# Patient Record
Sex: FEMALE | Race: BLACK OR AFRICAN AMERICAN | Employment: UNEMPLOYED | URBAN - METROPOLITAN AREA
[De-identification: names, ages, dates, MRNs, and addresses within clinical notes are randomized per-mention and may not be internally consistent; named-entity substitution may affect disease eponyms.]

---

## 2019-10-15 LAB
CREATININE, EXTERNAL: 0.7
HBA1C MFR BLD HPLC: 6.9 %
LDL-C, EXTERNAL: 93
MICROALBUMIN UR TEST STR-MCNC: 13 MG/DL

## 2020-01-10 ENCOUNTER — OFFICE VISIT (OUTPATIENT)
Dept: ENDOCRINOLOGY | Age: 45
End: 2020-01-10

## 2020-01-10 VITALS
WEIGHT: 138.6 LBS | DIASTOLIC BLOOD PRESSURE: 90 MMHG | HEIGHT: 61 IN | HEART RATE: 80 BPM | BODY MASS INDEX: 26.17 KG/M2 | SYSTOLIC BLOOD PRESSURE: 135 MMHG

## 2020-01-10 DIAGNOSIS — E10.65 HYPERGLYCEMIA DUE TO TYPE 1 DIABETES MELLITUS (HCC): Primary | ICD-10-CM

## 2020-01-10 LAB — HBA1C MFR BLD HPLC: 6.3 %

## 2020-01-10 RX ORDER — INSULIN LISPRO 100 [IU]/ML
INJECTION, SOLUTION INTRAVENOUS; SUBCUTANEOUS
COMMUNITY
Start: 2019-12-29 | End: 2020-04-09

## 2020-01-10 RX ORDER — ALPRAZOLAM 0.5 MG/1
0.5 TABLET ORAL
COMMUNITY
Start: 2018-07-24 | End: 2021-09-22

## 2020-01-10 RX ORDER — PAROXETINE 7.5 MG/1
CAPSULE ORAL
COMMUNITY
Start: 2019-12-06 | End: 2022-06-20

## 2020-01-10 RX ORDER — TRAZODONE HYDROCHLORIDE 50 MG/1
50 TABLET ORAL
COMMUNITY
End: 2021-09-22

## 2020-01-10 NOTE — PROGRESS NOTES
Chief Complaint   Patient presents with    New Patient    Diabetes    Diabetic Foot Exam       HPI  This is a 58-year-old -American female who I previously saw at [facility] for many years but last saw 6 years ago after she moved to [place]. She now returns to [place]. She recently got  and she and her  are now back in [place]. She now presents for follow-up. She continues on an Omni pod insulin pump and uses a Dexcom G6 system. Settings on her pump are as follows  Midnight 0.65  Noon 0.75  6 PM 0.6  Bolus 1-12  Correction 60  Duration 4 hours    Download of her Dexcom G6 reveals an in range of 60% and her A1c today is 6.3%. The blood sugars on the download are excellent. She has minimal hypo-or hyperglycemia which would certainly be consistent with the A1c of 6.3%. Part of this is related to the fact that she is exercising a whole lot more since moving back to [place] because she is not working. So she is wanting 5 days a week and walking the dogs. Her diet continues to be very healthy. She is not having any episodes of hypoglycemia. She does tell me that she is now perimenopausal.  ROS  Review of Systems - General ROS: negative  Psychological ROS: positive for - anxiety  Ophthalmic ROS: negative  ENT ROS: negative  Endocrine ROS: positive for - hot flashes  Respiratory ROS: no cough, shortness of breath, or wheezing  Cardiovascular ROS: no chest pain or dyspnea on exertion  Gastrointestinal ROS: no abdominal pain, change in bowel habits, or black or bloody stools  Genito-Urinary ROS: no dysuria, trouble voiding, or hematuria  Musculoskeletal ROS: negative  positive for - joint pain  No family history on file.      Social History     Socioeconomic History    Marital status:      Spouse name: Not on file    Number of children: Not on file    Years of education: Not on file    Highest education level: Not on file   Tobacco Use    Smoking status: Former Smoker    Smokeless tobacco: Never Used   Substance and Sexual Activity    Alcohol use: Yes    Drug use: No        Vitals:    01/10/20 0910   BP: 135/90   Pulse: 80   Weight: 138 lb 9.6 oz (62.9 kg)   Height: 5' 1\" (1.549 m)   PainSc:   0 - No pain        Physical Examination: General appearance - alert, well appearing, and in no distress  Neck - supple, no significant adenopathy, thyroid exam: thyroid is normal in size without nodules or tenderness  Chest - clear to auscultation, no wheezes, rales or rhonchi, symmetric air entry  Heart - normal rate, regular rhythm, normal S1, S2, no murmurs, rubs, clicks or gallops  Abdomen - soft, nontender, nondistended, no masses or organomegaly  Neurological - alert, oriented, normal speech, no focal findings or movement disorder noted  Musculoskeletal - no joint tenderness, deformity or swelling  Skin - normal coloration and turgor, no rashes, no suspicious skin lesions noted    Diabetic foot exam:     Left: Reflexes 2+     Vibratory sensation normal    Filament test normal sensation with micro filament   Pulse DP: 2+ (normal)   Pulse PT: 2+ (normal)   Deformities: None  Right: Reflexes 2+   Vibratory sensation normal   Filament test normal sensation with micro filament   Pulse DP: 2+ (normal)   Pulse PT: 2+ (normal)   Deformities: None      ASSESSMENT & PLAN  This is a 70-year-old -American female with type 1 diabetes mellitus currently on an Omni pod insulin pump and a Dexcom G6 system with an A1c of 6.3%. There is certainly nothing to change in terms of her insulin pump settings and her overall diabetes strategy. There are several issues:  1. Patient has complaints of diffuse muscle aches. She tells me she was in the [facility] and has been evaluated for a number of potential pathogens that might be causing the muscle aches. As far she knows she is never been tested for Lyme disease although she was living in [place] for 6 years.   I will send a referral for rheumatology evaluation. 2.  Anxiety currently well controlled.

## 2020-03-17 ENCOUNTER — TELEPHONE (OUTPATIENT)
Dept: ENDOCRINOLOGY | Age: 45
End: 2020-03-17

## 2020-03-17 NOTE — TELEPHONE ENCOUNTER
Pt called she wanted to know since she's type1 diabetic, she wanted to know, who she needs to contact if she start getting cough, fever and other symptoms, she just want to be proactive. Please advise. She can be reach @ [phone].

## 2020-04-17 ENCOUNTER — VIRTUAL VISIT (OUTPATIENT)
Dept: ENDOCRINOLOGY | Age: 45
End: 2020-04-17

## 2020-04-17 DIAGNOSIS — E10.65 HYPERGLYCEMIA DUE TO TYPE 1 DIABETES MELLITUS (HCC): Primary | ICD-10-CM

## 2020-04-17 RX ORDER — BLOOD SUGAR DIAGNOSTIC
STRIP MISCELLANEOUS
COMMUNITY
Start: 2020-01-31 | End: 2021-02-22 | Stop reason: SDUPTHER

## 2020-04-17 RX ORDER — DICLOFENAC SODIUM 10 MG/G
GEL TOPICAL
COMMUNITY
Start: 2020-03-14 | End: 2022-06-20

## 2020-04-17 NOTE — PROGRESS NOTES
This is an established visit conducted via telemedicine using [employer] video. The patient has been instructed that this meets HIPAA criteria and acknowledges and agrees to this method of visitation. This is a 40-year-old -American female who I previously saw at [facility] for many years but last saw 6 years ago after she moved to [place]. She now returns to [place]. She recently got  and she and her  are now back in [place]. She now presents for follow-up.     She continues on an Omni pod insulin pump and uses a Dexcom G6 system. Settings on her pump are as follows  Midnight 0.65  Noon 0.75  6 PM 0.6  Bolus 1-12  Correction 60  Duration 4 hours    I downloaded her Dexcom system. She now has a calculated A1c of 7.2. She is 56% in range. The issue appears to be higher blood sugars in the evening which she then corrects and can sometimes cause some overnight mild hypoglycemia. None of the episodes are severe. In fact she only has 2% of her blood sugar readings that are \"low\". She feels well. She saw a rheumatologist for evaluation of what she thought might be rheumatoid arthritis. She was told it was skeletal muscle and not joint issues. She was given a cream.  She is not very satisfied with the answer. But she does go for walks with the dog every day and overall is doing well. She does admit that since the coronavirus, she is not out as much and she is not running and that was controlling her blood sugar much more extensively. Impression type 1 diabetes mellitus with overall good blood sugar control and an insulin pump  Plan: I did not change the regimen. I did focus on the evening meals so that she will be correcting at night and get low overnight. We will see her back in 4 months or sooner as needed.     We spent more than 25 mintutes face to face, more than 50% was in counseling regarding diet, exercise and carbohydrate intake.

## 2020-08-26 ENCOUNTER — VIRTUAL VISIT (OUTPATIENT)
Dept: ENDOCRINOLOGY | Age: 45
End: 2020-08-26
Payer: COMMERCIAL

## 2020-08-26 DIAGNOSIS — E10.65 HYPERGLYCEMIA DUE TO TYPE 1 DIABETES MELLITUS (HCC): Primary | ICD-10-CM

## 2020-08-26 PROCEDURE — 99214 OFFICE O/P EST MOD 30 MIN: CPT | Performed by: INTERNAL MEDICINE

## 2020-08-26 RX ORDER — HYDROXYCHLOROQUINE SULFATE 200 MG/1
1 TABLET, FILM COATED ORAL 3 TIMES WEEKLY
COMMUNITY
Start: 2020-07-29 | End: 2022-06-20

## 2020-08-26 NOTE — PROGRESS NOTES
This is an established visit conducted via telemedicine using [employer] video. The patient has been instructed that this meets HIPAA criteria ,that they may receive a bill for these services and acknowledges and agrees to this method of visitation. This is a 62-year-old -American female with type 1 diabetes mellitus who I previously saw at [facility] for many years but last saw 6 years ago after she moved to [place].  She now returns to [place].  She recently got  and she and her  are now back in [place]. Ag Morel now presents for follow-up.     She continues on an Omni pod insulin pump and uses a Dexcom G6 system.  Settings on her pump are as follows  Midnight 0.65  Noon 0.75  6 PM 0.6  Bolus 1-12  Correction 60  Duration 4 hours     I downloaded her Dexcom system. She now has a calculated A1c of 7.0. She is 58% in range. 5% lows. The issue appears to be higher blood sugars in the evening which she then corrects and can sometimes cause some overnight mild hypoglycemia. None of the episodes are severe. In fact she only has 2% of her blood sugar readings that are \"low\". She feels well. She saw a rheumatologist for evaluation of what she thought might be rheumatoid arthritis. She was told it was skeletal muscle and not joint issues. 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 But she does go for walks with the dog every day and overall is doing well. She does admit that since the coronavirus, she is not out as much and she is not running and that was controlling her blood sugar much more extensively.     Examination  GENERAL: NCAT, Appears well nourished   EYES: EOMI, non-icteric, no proptosis   Ear/Nose/Throat: NCAT, no visible inflammation or masses   CARDIOVASCULAR: no cyanosis, no visible JVD   RESPIRATORY: comfortable respirations observed, no cyanosis   MUSCULOSKELETAL: Normal ROM of upper extremities observed   SKIN: No edema, rash, or other significant changes observed   NEUROLOGIC:  AAOx3   PSYCHIATRIC: Normal affect, Normal insight and judgement       Impression type 1 diabetes mellitus with an A1c of 7% based on her Dexcom download. Plan: I did not change the regimen. She and her  in the are in the process of moving to a new house and so there is a lot of physical activity which can occasionally cause some lows but overall she is compensating well.   We will see her back in 3 months.

## 2020-11-23 ENCOUNTER — VIRTUAL VISIT (OUTPATIENT)
Dept: ENDOCRINOLOGY | Age: 45
End: 2020-11-23
Payer: COMMERCIAL

## 2020-11-23 DIAGNOSIS — E10.65 HYPERGLYCEMIA DUE TO TYPE 1 DIABETES MELLITUS (HCC): Primary | ICD-10-CM

## 2020-11-23 PROCEDURE — 99214 OFFICE O/P EST MOD 30 MIN: CPT | Performed by: INTERNAL MEDICINE

## 2020-11-23 NOTE — PROGRESS NOTES
This is an established visit conducted via telemedicine using [employer] video. The patient has been instructed that this meets HIPAA criteria ,that they may receive a bill for these services and acknowledges and agrees to this method of visitation.     This is a 58-year-old -American female with type 1 diabetes mellitus who I previously saw at [address] [place] [address] for many years but last saw 6 years ago after she moved to [place].  She now returns to [facility].  She recently got  and she and her  are now back in [facility]. Jacqui Carias now presents for follow-up.     She continues on an Omni pod insulin pump and uses a Dexcom G6 system.  Settings on her pump are as follows  Midnight 0.65  Noon 0.75  6 PM 0.6  Bolus 1-12  Correction 60  Duration 4 hours     I downloaded her Dexcom system.  She now has a calculated A1c of 7.1.  She is 55% in range. 5% lows. The issue appears to be higher blood sugars in the evening which she then corrects and can sometimes cause some overnight mild hypoglycemia.  None of the episodes are severe.  In fact she only has 2% of her blood sugar readings that are \"low\".  She feels well.  She saw a rheumatologist for evaluation of what she thought might be rheumatoid arthritis. Jacqui Carias was told it was skeletal muscle and not joint issues. 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 But she does go for walks with the dog every day and overall is doing well.  She does admit that since the coronavirus, she is not out as much and she is not running and that was controlling her blood sugar much more extensively. She does tell me that a right ganglion cyst which has been drained twice has recurred and she is concerned about that.   She is also concerned about her right heel where there is a little bit of \"numbness\". Otherwise she continues to do very well. Examination  GENERAL: NCAT, Appears well nourished   EYES: EOMI, non-icteric, no proptosis   Ear/Nose/Throat: NCAT, no visible inflammation or masses   CARDIOVASCULAR: no cyanosis, no visible JVD   RESPIRATORY: comfortable respirations observed, no cyanosis   MUSCULOSKELETAL: Normal ROM of upper extremities observed. There is a very large ganglion cyst on the dorsum of the right hand  SKIN: No edema, rash, or other significant changes observed   NEUROLOGIC:  AAOx3   PSYCHIATRIC: Normal affect, Normal insight and judgement       Impression type 1 diabetes mellitus with reasonable glucose control on an Omni pod/Dexcom system  Plan: She is hoping to get the new new Horizon system as soon as it becomes available. She will need to see a hand surgeon regarding the ganglion cyst and I will try to find one for her and refer her.   No other changes were made.

## 2021-02-22 ENCOUNTER — VIRTUAL VISIT (OUTPATIENT)
Dept: ENDOCRINOLOGY | Age: 46
End: 2021-02-22
Payer: COMMERCIAL

## 2021-02-22 DIAGNOSIS — E10.65 HYPERGLYCEMIA DUE TO TYPE 1 DIABETES MELLITUS (HCC): Primary | ICD-10-CM

## 2021-02-22 DIAGNOSIS — Z96.41 INSULIN PUMP IN PLACE: ICD-10-CM

## 2021-02-22 PROCEDURE — 99214 OFFICE O/P EST MOD 30 MIN: CPT | Performed by: INTERNAL MEDICINE

## 2021-02-22 RX ORDER — BLOOD SUGAR DIAGNOSTIC
STRIP MISCELLANEOUS
Qty: 150 STRIP | Refills: 3 | Status: SHIPPED | OUTPATIENT
Start: 2021-02-22

## 2021-02-22 NOTE — PROGRESS NOTES
This is an established visit conducted via telemedicine using [email or web address] video.  The patient has been instructed that this meets HIPAA criteria ,that they may receive a bill for these services and acknowledges and agrees to this method of visitation.     This is a 48-year-old -American female with type 1 diabetes mellitus who I previously saw at [facility] for many years but last saw 6 years ago after she moved to [place].  She now returns to [address] [facility].  She recently got  and she and her  are now back in [address] [facility]. Marilynn Ayoub now presents for follow-up.     She continues on an Omni pod insulin pump and uses a Dexcom G6 system.  Settings on her pump are as follows  Midnight 0.65  Noon 0.75  6 PM 0.6  Bolus 1-12  Correction 60  Duration 4 hours     I downloaded her Dexcom system.  She now has a calculated A1c of 6.8.  She is 59% in range.  7% lows.  Most of the lows are occurring overnight. This is actually occurring more frequently now. She also tells me that occasionally which she put when she puts an Omni pod pod on, she experiences some bleeding. It appears that the bleeding is when she takes the pod off and she sees some blood on the backside of the pod.   The issue also appears to be higher blood sugars in the evening which she then corrects and can sometimes cause some overnight mild hypoglycemia.  None of the episodes are severe.  She feels well.  She saw a rheumatologist for evaluation of what she thought might be rheumatoid arthritis. Marilynn Ayoub was told it was skeletal muscle and not joint issues.  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 But she does go for walks with the dog every day and overall is doing well.  She does admit that since the coronavirus, she is not out as much and she is not running and that was controlling her blood sugar much more extensively.     She does tell me that a right ganglion cyst which has been drained twice has recurred and she is concerned about that. She is also concerned about her right heel where there is a little bit of \"numbness\". Otherwise she continues to do very well. Examination  GENERAL: NCAT, Appears well nourished   EYES: EOMI, non-icteric, no proptosis   Ear/Nose/Throat: NCAT, no visible inflammation or masses   CARDIOVASCULAR: no cyanosis, no visible JVD   RESPIRATORY: comfortable respirations observed, no cyanosis   MUSCULOSKELETAL: Normal ROM of upper extremities observed   SKIN: No edema, rash, or other significant changes observed   NEUROLOGIC:  AAOx3   PSYCHIATRIC: Normal affect, Normal insight and judgement       Impression  1. Type 1 diabetes mellitus with overall good glucose control on an Omni pod insulin pump and a Dexcom G6 system  2. Rheumatoid arthritis currently on hydroxychloroquine    Plan:  1. I did decrease her basal rate overnight. Her new settings will be as follows: Midnight 0.60 6 AM 0.65  0.75 6 PM 0.6  2. I would like to get screening laboratory test.  I have not done any since she been back to [place] but she is very uncomfortable going out currently and so we will defer this until the next visit.   3.  I will see her back in 3 months hopefully face-to-face.

## 2021-03-15 ENCOUNTER — TELEPHONE (OUTPATIENT)
Dept: ENDOCRINOLOGY | Age: 46
End: 2021-03-15

## 2021-06-23 ENCOUNTER — OFFICE VISIT (OUTPATIENT)
Dept: ENDOCRINOLOGY | Age: 46
End: 2021-06-23
Payer: COMMERCIAL

## 2021-06-23 VITALS
DIASTOLIC BLOOD PRESSURE: 85 MMHG | WEIGHT: 134.6 LBS | SYSTOLIC BLOOD PRESSURE: 128 MMHG | BODY MASS INDEX: 26.42 KG/M2 | HEART RATE: 80 BPM | HEIGHT: 60 IN

## 2021-06-23 DIAGNOSIS — Z96.41 INSULIN PUMP IN PLACE: ICD-10-CM

## 2021-06-23 DIAGNOSIS — E10.65 HYPERGLYCEMIA DUE TO TYPE 1 DIABETES MELLITUS (HCC): Primary | ICD-10-CM

## 2021-06-23 DIAGNOSIS — E10.65 HYPERGLYCEMIA DUE TO TYPE 1 DIABETES MELLITUS (HCC): ICD-10-CM

## 2021-06-23 LAB — HBA1C MFR BLD HPLC: 7.1 %

## 2021-06-23 PROCEDURE — 83036 HEMOGLOBIN GLYCOSYLATED A1C: CPT | Performed by: INTERNAL MEDICINE

## 2021-06-23 PROCEDURE — 99214 OFFICE O/P EST MOD 30 MIN: CPT | Performed by: INTERNAL MEDICINE

## 2021-06-23 PROCEDURE — 3051F HG A1C>EQUAL 7.0%<8.0%: CPT | Performed by: INTERNAL MEDICINE

## 2021-06-23 RX ORDER — ALPRAZOLAM 0.25 MG/1
0.25 TABLET ORAL
Qty: 30 TABLET | Refills: 1 | Status: SHIPPED | OUTPATIENT
Start: 2021-06-23

## 2021-06-23 NOTE — PROGRESS NOTES
This is a 58-year-old -American female with type 1 diabetes mellitus who I previously saw at [facility] for many years but last saw 6 years ago after she moved to [place].  She now returns to [address] [place] [address].  She recently got  and she and her  are now back in [address] [facility]. [place] now presents for follow-up.     She continues on an Omni pod insulin pump and uses a Dexcom G6 system.  Settings on her pump are as follows  Midnight 0.65  Noon 0.75  6 PM 0.6  Bolus 1-12  Correction 60  Duration 4 hours     I downloaded her Dexcom system.  She now has a calculated A1c of 7.1.  She is 59% in range.  7% lows.  Most of the lows are occurring overnight. This is actually occurring more frequently now. She also tells me that occasionally which she put when she puts an Omni pod pod on, she experiences some bleeding. It appears that the bleeding is when she takes the pod off and she sees some blood on the backside of the pod.   The issue also appears to be higher blood sugars in the evening which she then corrects and can sometimes cause some overnight mild hypoglycemia.  None of the episodes are severe.  She feels well.  She saw a rheumatologist for evaluation of what she thought might be rheumatoid arthritis. [place] was told it was skeletal muscle and not joint issues.  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 But she does go for walks with the dog every day and overall is doing well.  She does admit that since the coronavirus, she is not out as much and she is not running and that was controlling her blood sugar much more extensively.     She does tell me that a right ganglion cyst which has been drained twice has recurred and she is concerned about that. [place] is also concerned about her right heel where there is a little bit of \"numbness\".  Otherwise she continues to do very well. Impression  Blood pressure 128/85  Pulse 76  Weight 134  HEENT unremarkable  Lungs clear  Heart reveals a regular rate and rhythm  Abdomen benign  Extremities  There is a right ganglion cyst on the dorsum of the right hand  Feet are dry without ulceration or tissue breakdown  Diabetic foot exam:     Left Foot:   Visual Exam: normal    Pulse DP: 2+ (normal)   Filament test: normal sensation    Vibratory sensation: normal      Right Foot:   Visual Exam: normal    Pulse DP: 2+ (normal)   Filament test: normal sensation    Vibratory sensation: normal    Pression  1. Type 1 diabetes mellitus with an A1c of 7.1%  2.  Stress related fluctuations in blood sugar  3. Right ganglion cyst    Plan:  1. We have continued the above regimen. 2.  We will see her back in 4 months  3. I did order all screening laboratory tests and will call her with the results.

## 2021-09-14 ENCOUNTER — TELEPHONE (OUTPATIENT)
Dept: ENDOCRINOLOGY | Age: 46
End: 2021-09-14

## 2021-09-14 NOTE — TELEPHONE ENCOUNTER
----- Message from April Rabb sent at 9/14/2021  3:06 PM EDT -----  Regarding: /Telephone  General Message/Vendor Calls    Caller's first and last name: n/a      Reason for call: Pt need Information to put into her new pump     Callback required yes/no and why: yes , to give the information to her       Best contact number(s): [phone]      Details to clarify the request: Pt need Information to put into her new pump ( Correction factor , how many units throughout the day      April Rabb

## 2021-09-22 ENCOUNTER — OFFICE VISIT (OUTPATIENT)
Dept: ENDOCRINOLOGY | Age: 46
End: 2021-09-22
Payer: COMMERCIAL

## 2021-09-22 VITALS
HEIGHT: 60 IN | WEIGHT: 133.4 LBS | HEART RATE: 80 BPM | DIASTOLIC BLOOD PRESSURE: 89 MMHG | SYSTOLIC BLOOD PRESSURE: 139 MMHG | BODY MASS INDEX: 26.19 KG/M2

## 2021-09-22 DIAGNOSIS — Z96.41 INSULIN PUMP IN PLACE: ICD-10-CM

## 2021-09-22 DIAGNOSIS — E10.65 HYPERGLYCEMIA DUE TO TYPE 1 DIABETES MELLITUS (HCC): Primary | ICD-10-CM

## 2021-09-22 LAB — HBA1C MFR BLD HPLC: 6.5 %

## 2021-09-22 PROCEDURE — 83036 HEMOGLOBIN GLYCOSYLATED A1C: CPT | Performed by: INTERNAL MEDICINE

## 2021-09-22 PROCEDURE — 99214 OFFICE O/P EST MOD 30 MIN: CPT | Performed by: INTERNAL MEDICINE

## 2021-09-22 NOTE — PROGRESS NOTES
This is a 79-year-old -American female with type 1 diabetes mellitus who I previously saw at [address] [place] [address] for many years but last saw 6 years ago after she moved to [place].  She now returns to [place].  She recently got  and she and her  are now back in [place]. Saurabh Le now presents for follow-up.     She continues on an Omni pod insulin pump and uses a Dexcom G6 system.  Settings on her pump are as follows  Midnight 0.65  Noon 0.75  6 PM 0.6  Bolus 1-12  Correction 60  Duration 4 hours     I downloaded her Dexcom system.  She now has a A1c of 6.5%.  She is 49% in range.  7% lows.  Most of the lows are occurring overnight.  This is actually occurring more frequently now. She admits that because of the stress at work, she is drinking more alcohol (wine) and admits that the episodes of lows overnight are closely correlated with the alcohol intake.   She is also stressed and so she and her  are eating a lot of fast food and high carbohydrate meals for dinner resulting in some hyperglycemia during the day and evening.      She saw a rheumatologist for evaluation of what she thought might be rheumatoid arthritis. Saurabh Le was told it was skeletal muscle and not joint issues.  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 But she does go for walks with the dog every day and overall is doing well.  She does admit that since the coronavirus, she is not out as much and she is not running and that was controlling her blood sugar much more extensively.     She does tell me that a right ganglion cyst which has been drained twice has recurred and she is concerned about that. Saurabh Le is also concerned about her right heel where there is a little bit of \"numbness\".  Otherwise she continues to do very well.    Examination  Blood pressure 139/89  Pulse 80  Weight 133  BMI 26.1  HEENT unremarkable  Lungs clear  Heart reveals a regular rate and rhythm  Abdomen benign  Extremities unremarkable  Diabetic foot exam:     Left Foot:   Visual Exam: normal    Pulse DP: 2+ (normal)   Filament test: normal sensation    Vibratory sensation: normal      Right Foot:   Visual Exam: normal    Pulse DP: 2+ (normal)   Filament test: normal sensation    Vibratory sensation: normal    Impression  1. Type 1 diabetes mellitus with an A1c today of 6.5% but a download from her Dexcom that shows considerable variability in blood sugars  2. Chronic anxiety    Plan:  1. I have continue the settings on her pump. 2.  I did strongly advise her to curtail her alcohol intake in the evenings to improve her sleep and decrease her frequency of hypoglycemia  3. I will see her back in 3 months.

## 2021-11-29 ENCOUNTER — TELEPHONE (OUTPATIENT)
Dept: ENDOCRINOLOGY | Age: 46
End: 2021-11-29

## 2021-11-29 DIAGNOSIS — E10.65 HYPERGLYCEMIA DUE TO TYPE 1 DIABETES MELLITUS (HCC): Primary | ICD-10-CM

## 2021-11-29 RX ORDER — BLOOD-GLUCOSE SENSOR
1 EACH MISCELLANEOUS
Qty: 9 EACH | Refills: 3 | Status: SHIPPED | OUTPATIENT
Start: 2021-11-29 | End: 2022-09-19 | Stop reason: SDUPTHER

## 2021-11-29 RX ORDER — BLOOD-GLUCOSE TRANSMITTER
1 EACH MISCELLANEOUS DAILY
Qty: 1 EACH | Refills: 3 | Status: SHIPPED | OUTPATIENT
Start: 2021-11-29 | End: 2022-09-19 | Stop reason: SDUPTHER

## 2021-11-29 NOTE — TELEPHONE ENCOUNTER
----- Message from Marisol Nicole sent at 11/29/2021  9:57 AM EST -----  Regarding: /telephone  Contact: [phone]  General Message/Vendor Calls    Caller's first and last name: n/a      Reason for call: Is calling to make sure Decom supplies were sent to [facility] required yes/no and why: Yes      Best contact number(s): [phone]      Details to clarify the request: Is urgent, has been out of supplies for about a week.  Was told all dr needed to do was sign order that was faxed before they can send out the delivery       Marisol Nicole
Dr. Manolo Posey has contacted [address] [place] [address]  and sent a prescription to them for the pt's supplies
all other ROS negative except as per HPI

## 2022-02-14 RX ORDER — INSULIN LISPRO 100 [IU]/ML
INJECTION, SOLUTION INTRAVENOUS; SUBCUTANEOUS
Qty: 50 ML | Refills: 3 | Status: SHIPPED | OUTPATIENT
Start: 2022-02-14 | End: 2022-02-18

## 2022-02-14 NOTE — TELEPHONE ENCOUNTER
Requested Prescriptions     Pending Prescriptions Disp Refills    insulin lispro (HumaLOG U-100 Insulin) 100 unit/mL injection 50 mL 3     Si UNITS DAILY VIA INSULIN PUMP

## 2022-02-14 NOTE — TELEPHONE ENCOUNTER
Pt called and LVM 2/14 @ 9:47am. Stated needs a refill of her humaLOG and sent to a new pharmacy as well. Pt said [facility] in [place]. [facility] and pharmacy # is [id number].

## 2022-02-18 RX ORDER — INSULIN LISPRO 100 [IU]/ML
INJECTION, SOLUTION INTRAVENOUS; SUBCUTANEOUS
Qty: 50 ML | Refills: 2 | Status: SHIPPED | OUTPATIENT
Start: 2022-02-18 | End: 2022-09-19 | Stop reason: SDUPTHER

## 2022-03-07 ENCOUNTER — OFFICE VISIT (OUTPATIENT)
Dept: ENDOCRINOLOGY | Age: 47
End: 2022-03-07
Payer: COMMERCIAL

## 2022-03-07 VITALS
DIASTOLIC BLOOD PRESSURE: 89 MMHG | HEIGHT: 60 IN | SYSTOLIC BLOOD PRESSURE: 142 MMHG | WEIGHT: 140 LBS | BODY MASS INDEX: 27.48 KG/M2 | HEART RATE: 79 BPM

## 2022-03-07 DIAGNOSIS — E10.65 HYPERGLYCEMIA DUE TO TYPE 1 DIABETES MELLITUS (HCC): Primary | ICD-10-CM

## 2022-03-07 LAB
ALBUMIN SERPL-MCNC: 4.1 G/DL (ref 3.5–5)
ALBUMIN/GLOB SERPL: 1.2 {RATIO} (ref 1.1–2.2)
ALP SERPL-CCNC: 98 U/L (ref 45–117)
ALT SERPL-CCNC: 36 U/L (ref 12–78)
ANION GAP SERPL CALC-SCNC: 2 MMOL/L (ref 5–15)
AST SERPL-CCNC: 33 U/L (ref 15–37)
BILIRUB SERPL-MCNC: 0.7 MG/DL (ref 0.2–1)
BUN SERPL-MCNC: 16 MG/DL (ref 6–20)
BUN/CREAT SERPL: 24 (ref 12–20)
CALCIUM SERPL-MCNC: 10.3 MG/DL (ref 8.5–10.1)
CHLORIDE SERPL-SCNC: 104 MMOL/L (ref 97–108)
CO2 SERPL-SCNC: 30 MMOL/L (ref 21–32)
CREAT SERPL-MCNC: 0.68 MG/DL (ref 0.55–1.02)
CREAT UR-MCNC: 218 MG/DL
ERYTHROCYTE [DISTWIDTH] IN BLOOD BY AUTOMATED COUNT: 13.7 % (ref 11.5–14.5)
GLOBULIN SER CALC-MCNC: 3.3 G/DL (ref 2–4)
GLUCOSE SERPL-MCNC: 131 MG/DL (ref 65–100)
HBA1C MFR BLD HPLC: 6.9 %
HCT VFR BLD AUTO: 39.5 % (ref 35–47)
HGB BLD-MCNC: 14.2 G/DL (ref 11.5–16)
MCH RBC QN AUTO: 31.4 PG (ref 26–34)
MCHC RBC AUTO-ENTMCNC: 35.9 G/DL (ref 30–36.5)
MCV RBC AUTO: 87.4 FL (ref 80–99)
MICROALBUMIN UR-MCNC: 2.09 MG/DL
MICROALBUMIN/CREAT UR-RTO: 10 MG/G (ref 0–30)
NRBC # BLD: 0 K/UL (ref 0–0.01)
NRBC BLD-RTO: 0 PER 100 WBC
PLATELET # BLD AUTO: 317 K/UL (ref 150–400)
PMV BLD AUTO: 10.2 FL (ref 8.9–12.9)
POTASSIUM SERPL-SCNC: 4 MMOL/L (ref 3.5–5.1)
PROT SERPL-MCNC: 7.4 G/DL (ref 6.4–8.2)
RBC # BLD AUTO: 4.52 M/UL (ref 3.8–5.2)
SODIUM SERPL-SCNC: 136 MMOL/L (ref 136–145)
T4 FREE SERPL-MCNC: 0.8 NG/DL (ref 0.8–1.5)
TSH SERPL DL<=0.05 MIU/L-ACNC: 1 UIU/ML (ref 0.36–3.74)
WBC # BLD AUTO: 3.6 K/UL (ref 3.6–11)

## 2022-03-07 PROCEDURE — 99214 OFFICE O/P EST MOD 30 MIN: CPT | Performed by: INTERNAL MEDICINE

## 2022-03-07 PROCEDURE — 83036 HEMOGLOBIN GLYCOSYLATED A1C: CPT | Performed by: INTERNAL MEDICINE

## 2022-03-07 NOTE — PROGRESS NOTES
This is a 76-year-old -American female with type 1 diabetes mellitus who I previously saw at [facility] for many years but last saw 6 years ago after she moved to [place].  She now returns to [place].  She recently got  and she and her  are now back in [place]. [place] now presents for follow-up.     She continues on an Omni pod insulin pump and uses a Dexcom G6 system.  Settings on her pump are as follows  Midnight 0.65  Noon 0.75  6 PM 0.6  Bolus 1-12  Correction 60  Duration 4 hours     I downloaded her Dexcom system.  She now has a A1c of 6.9%.  She is 48% in range.  7% lows.  Most of the lows are occurring overnight.  This is actually occurring more frequently now. She admits that because of the stress at work, she is drinking more alcohol (wine) and admits that the episodes of lows overnight are closely correlated with the alcohol intake. She is also stressed and so she and her  are eating a lot of fast food and high carbohydrate meals for dinner resulting in some hyperglycemia during the day and evening.       She saw a rheumatologist for evaluation of what she thought might be rheumatoid arthritis.  She was told it was skeletal muscle and not joint issues.  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 But she does go for walks with the dog every day and overall is doing well.  She does admit that since the coronavirus, she is not out as much and she is not running and that was controlling her blood sugar much more extensively.     She does tell me that a right ganglion cyst which has been drained twice has recurred and she is concerned about that. [place] is also concerned about her right heel where there is a little bit of \"numbness\".     Examination  Blood pressure 142/89  Pulse 64  Weight 140  BMI 27.3  HEENT unremarkable  Lungs clear  Heart reveals a regular rate and rhythm  Abdomen benign  Extremities unremarkable  Diabetic foot exam:     Left Foot:   Visual Exam: normal    Pulse DP: 2+ (normal)   Filament test: normal sensation    Vibratory sensation: normal      Right Foot:   Visual Exam: normal    Pulse DP: 2+ (normal)   Filament test: normal sensation    Vibratory sensation: normal  Impression  1. Type 1 diabetes mellitus with overall good glucose control on a OmniPod insulin pump using a Dexcom continuous glucose monitoring system  2. Chronic anxiety  3. Excess alcohol consumption    Plan:  1. As noted previously, I have strongly discouraged the alcohol intake. She has not yet made a change but I hope she will. 2.  I did order all screening labs which are pending and I will call her  3. We did talk about reaching out to insulin at about the OmniPod 5 which I think will improve things considerably for her  4.   We will see her back in 4 months

## 2022-03-19 PROBLEM — E10.65 HYPERGLYCEMIA DUE TO TYPE 1 DIABETES MELLITUS (HCC): Status: ACTIVE | Noted: 2020-01-10

## 2022-06-20 ENCOUNTER — OFFICE VISIT (OUTPATIENT)
Dept: ENDOCRINOLOGY | Age: 47
End: 2022-06-20
Payer: COMMERCIAL

## 2022-06-20 VITALS
BODY MASS INDEX: 27.88 KG/M2 | DIASTOLIC BLOOD PRESSURE: 84 MMHG | SYSTOLIC BLOOD PRESSURE: 148 MMHG | WEIGHT: 142 LBS | HEART RATE: 81 BPM | HEIGHT: 60 IN

## 2022-06-20 DIAGNOSIS — E10.65 HYPERGLYCEMIA DUE TO TYPE 1 DIABETES MELLITUS (HCC): Primary | ICD-10-CM

## 2022-06-20 LAB — HBA1C MFR BLD HPLC: 6.6 %

## 2022-06-20 PROCEDURE — 99214 OFFICE O/P EST MOD 30 MIN: CPT | Performed by: INTERNAL MEDICINE

## 2022-06-20 PROCEDURE — 83036 HEMOGLOBIN GLYCOSYLATED A1C: CPT | Performed by: INTERNAL MEDICINE

## 2022-06-20 PROCEDURE — 3044F HG A1C LEVEL LT 7.0%: CPT | Performed by: INTERNAL MEDICINE

## 2022-06-20 RX ORDER — INSULIN PMP CART,AUT,G6/7,CNTR
1 EACH SUBCUTANEOUS
Qty: 1 KIT | Refills: 0 | Status: SHIPPED | OUTPATIENT
Start: 2022-06-20

## 2022-06-20 RX ORDER — INSULIN PMP CART,AUT,G6/7,CNTR
1 EACH SUBCUTANEOUS
Qty: 1 BOX | Refills: 4 | Status: SHIPPED | OUTPATIENT
Start: 2022-06-20

## 2022-06-20 NOTE — PROGRESS NOTES
This is a 58-year-old -American female with type 1 diabetes mellitus who I previously saw at [facility] for many years but last saw 6 years ago after she moved to [place].  She now returns to [place].  She recently got  and she and her  are now back in [place]. [place] now presents for follow-up.     She continues on an Omni pod insulin pump and uses a Dexcom G6 system.  Settings on her pump are as follows  Midnight 0.65  Noon 0.75  6 PM 0.6  Bolus 1-12  Correction 60  Duration 4 hours     I downloaded her Dexcom system.  She now has a A1c of 6.6%.  She is 50% in range.  7% lows.  Most of the lows are occurring overnight.  This is actually occurring more frequently now.  She admits that because of the stress at work, she is drinking more alcohol (wine) and admits that the episodes of lows overnight are closely correlated with the alcohol intake.  She is also stressed and so she and her  are eating a lot of fast food and high carbohydrate meals for dinner resulting in some hyperglycemia during the day and evening. Examination  Blood pressure 148/84  Pulse 80  Weight 142 (she has gained 10 pounds in the last year)  BMI 27.7  HEENT unremarkable  Lungs clear  Heart reveals a regular rate and rhythm  Abdomen benign  Extremities unremarkable  Diabetic foot exam:     Left Foot:   Visual Exam: normal    Pulse DP: 2+ (normal)   Filament test: normal sensation    Vibratory sensation: normal      Right Foot:   Visual Exam: normal    Pulse DP: 2+ (normal)   Filament test: normal sensation    Vibratory sensation: normal    Impression  1. Type 1 diabetes mellitus with an A1c of 6.6% on OmniPod system using a parallel Dexcom system  2. Chronic anxiety  3. Alcohol use, improving    Plan:  1. I have decreased her overnight basal from 0.65-0.60  2. No other changes were made. 3.  I did order the OmniPod 5 for her.   I think this will be huge improvement in terms of overall blood sugar control and minimize hypoglycemia and perhaps help with her weight gain  4. I will see her when she gets back from [facility].

## 2022-09-19 ENCOUNTER — OFFICE VISIT (OUTPATIENT)
Dept: ENDOCRINOLOGY | Age: 47
End: 2022-09-19
Payer: COMMERCIAL

## 2022-09-19 VITALS
HEIGHT: 60 IN | WEIGHT: 139 LBS | HEART RATE: 86 BPM | BODY MASS INDEX: 27.29 KG/M2 | DIASTOLIC BLOOD PRESSURE: 106 MMHG | SYSTOLIC BLOOD PRESSURE: 147 MMHG

## 2022-09-19 DIAGNOSIS — E10.65 HYPERGLYCEMIA DUE TO TYPE 1 DIABETES MELLITUS (HCC): Primary | ICD-10-CM

## 2022-09-19 LAB — HBA1C MFR BLD HPLC: 7.1 %

## 2022-09-19 PROCEDURE — 99214 OFFICE O/P EST MOD 30 MIN: CPT | Performed by: INTERNAL MEDICINE

## 2022-09-19 PROCEDURE — 3051F HG A1C>EQUAL 7.0%<8.0%: CPT | Performed by: INTERNAL MEDICINE

## 2022-09-19 PROCEDURE — 83036 HEMOGLOBIN GLYCOSYLATED A1C: CPT | Performed by: INTERNAL MEDICINE

## 2022-09-19 RX ORDER — BLOOD-GLUCOSE TRANSMITTER
1 EACH MISCELLANEOUS DAILY
Qty: 1 EACH | Refills: 3 | Status: SHIPPED | OUTPATIENT
Start: 2022-09-19 | End: 2022-09-26 | Stop reason: SDUPTHER

## 2022-09-19 RX ORDER — INSULIN LISPRO 100 [IU]/ML
INJECTION, SOLUTION INTRAVENOUS; SUBCUTANEOUS
Qty: 50 ML | Refills: 2
Start: 2022-09-19

## 2022-09-19 RX ORDER — BLOOD-GLUCOSE SENSOR
1 EACH MISCELLANEOUS
Qty: 9 EACH | Refills: 3 | Status: SHIPPED | OUTPATIENT
Start: 2022-09-19 | End: 2022-09-26 | Stop reason: SDUPTHER

## 2022-09-19 NOTE — PROGRESS NOTES
This is a 59-year-old -American female with type 1 diabetes mellitus who I previously saw at [facility] for many years but last saw 6 years ago after she moved to [place]. She now returns to [facility]. She recently got  and she and her  are now back in [facility]. She now presents for follow-up. She continues on an Omni pod insulin pump and uses a Dexcom G6 system. Recently upgraded to the OmniPod 5 about 6 weeks ago and likes it very much. Settings on her pump are as follows  Midnight 0.65  Noon 0.75  6 PM 0.6  Bolus 1-12  Correction 60  Duration 4 hours     I downloaded her Dexcom system. She now has a A1c of 7.1%. She is 50% in range. 7% lows. Most of the lows are occurring overnight. This is actually occurring more frequently now. She admits that because of the stress at work, she is drinking more alcohol (wine) and admits that the episodes of lows overnight are closely correlated with the alcohol intake. She is also stressed and so she and her  are eating a lot of fast food and high carbohydrate meals for dinner resulting in some hyperglycemia during the day and evening. She and her  just got back from [facility] and she said it was relaxing and so she feels like she is doing better than she was before. She is trying to learn the new pump and allowing the artificial intelligence to learn her. She does admit that she is still hesitant to put in the accurate amount of carb because she is worried about low. However she is beginning to appreciate the artificial intelligence and the avoidance of hypoglycemia that it provides.       Examination  Blood pressure 147/106  Pulse 80 and regular  Weight 139  BMI 27.1  HEENT unremarkable  Lungs clear  Heart reveals a regular rate and rhythm  Abdomen benign  Extremities unremarkable  Diabetic foot exam:     Left Foot:   Visual Exam: normal    Pulse DP: 2+ (normal)   Filament test: normal sensation    Vibratory sensation: normal      Right Foot:   Visual Exam: normal    Pulse DP: 2+ (normal)   Filament test: normal sensation    Vibratory sensation: normal   Impression  1. Diabetes mellitus x20 years with an A1c of 7.1% now on an Omni pod 5  2. Hypertension with a normal microalbumin  3. Anxiety    Plan:  1. I have encouraged her to let the artificial intelligence to learn her and I suspect the numbers will get better and better  2. I did advise her to check her blood pressure at home and get back to me in a week or so. Anticipate adding an antihypertensive  3. I will see her back in 3 to 4 months.

## 2022-09-26 DIAGNOSIS — E10.65 HYPERGLYCEMIA DUE TO TYPE 1 DIABETES MELLITUS (HCC): ICD-10-CM

## 2022-09-26 RX ORDER — BLOOD-GLUCOSE SENSOR
1 EACH MISCELLANEOUS
Qty: 9 EACH | Refills: 3 | Status: SHIPPED | OUTPATIENT
Start: 2022-09-26

## 2022-09-26 RX ORDER — BLOOD-GLUCOSE TRANSMITTER
1 EACH MISCELLANEOUS DAILY
Qty: 1 EACH | Refills: 3 | Status: SHIPPED | OUTPATIENT
Start: 2022-09-26

## 2022-09-26 NOTE — TELEPHONE ENCOUNTER
Requested Prescriptions     Pending Prescriptions Disp Refills    Blood-Glucose Sensor (Dexcom G6 Sensor) kirill 9 Each 3     Si Each by Does Not Apply route every ten (10) days. Blood-Glucose Transmitter (Dexcom G6 Transmitter) kirill 1 Each 3     Si Each by Does Not Apply route daily.

## 2022-09-26 NOTE — TELEPHONE ENCOUNTER
9/26/2022  12:49 PM    Pt called to get refill on her dexcom sensors and transmitter either called into [facility] or her local pharmacy [facility]. She is out. Please call her back at [phone].  Thanks

## 2022-11-08 DIAGNOSIS — E10.65 HYPERGLYCEMIA DUE TO TYPE 1 DIABETES MELLITUS (HCC): ICD-10-CM

## 2022-11-08 RX ORDER — BLOOD-GLUCOSE SENSOR
1 EACH MISCELLANEOUS
Qty: 9 EACH | Refills: 3 | Status: SHIPPED | OUTPATIENT
Start: 2022-11-08

## 2022-11-08 RX ORDER — BLOOD-GLUCOSE TRANSMITTER
1 EACH MISCELLANEOUS DAILY
Qty: 1 EACH | Refills: 3 | Status: SHIPPED | OUTPATIENT
Start: 2022-11-08

## 2022-11-08 NOTE — TELEPHONE ENCOUNTER
Spoke to the pt and informed her that we received a fax from [facility] asking for us to send in a prescription for her dexcom supplies. Informed the pt that I wanted to make sure that this was correct being that Dr. Linda Farley just sent the supplies to [facility] in September. Pt informed me that her supplies should be going through [facility]. Informed the pt that I will pend the request to Dr. Linda Farley. Requested Prescriptions     Pending Prescriptions Disp Refills    Blood-Glucose Sensor (Dexcom G6 Sensor) kirill 9 Each 3     Si Each by Does Not Apply route every ten (10) days. Blood-Glucose Transmitter (Dexcom G6 Transmitter) kirill 1 Each 3     Si Each by Does Not Apply route daily.

## 2022-12-23 ENCOUNTER — VIRTUAL VISIT (OUTPATIENT)
Dept: ENDOCRINOLOGY | Age: 47
End: 2022-12-23
Payer: COMMERCIAL

## 2022-12-23 DIAGNOSIS — Z96.41 INSULIN PUMP IN PLACE: ICD-10-CM

## 2022-12-23 DIAGNOSIS — E10.65 HYPERGLYCEMIA DUE TO TYPE 1 DIABETES MELLITUS (HCC): Primary | ICD-10-CM

## 2022-12-23 PROCEDURE — 99214 OFFICE O/P EST MOD 30 MIN: CPT | Performed by: INTERNAL MEDICINE

## 2022-12-23 NOTE — PROGRESS NOTES
This patient was evaluated through a synchronous (real-time) audio-video encounter. The patient (or guardian if applicable) is aware that this is a billable service, which includes applicable co-pays. This Virtual Visit was conducted with patient's (and/or legal guardian's) consent. The visit was conducted pursuant to the emergency declaration under the [facility], [address] [place] [address] authority and the [employer] General Act. Patient identification was verified, and a caregiver was present when appropriate. The patient was located at: Home: [address] [address]  The provider was located at: Facility (Appt Department): [facility] [address] [facility] [id number]     This is a 70-year-old -American female with type 1 diabetes mellitus who I previously saw at [facility] for many years but last saw 6 years ago after she moved to [place]. She now returns to [facility]. She recently got  and she and her  are now back in [facility]. She now presents for follow-up. She continues on an Omni pod insulin pump and uses a Dexcom G6 system. Recently upgraded to the OmniPod 5 liked it at first but now she is more hesitant. She does not believe it is doing what it was supposed to do. She actually contacted [employer] and they sent her a new controller because they advised her that the controller and the sensor were not in sync as they are supposed to be. Settings on her pump are as follows  Midnight 0.65  Noon 0.75  6 PM 0.6  Bolus 1-12  Correction 60  Duration 4 hours     I downloaded her Dexcom system. She now has a A1c of 7.8%. Her A1c has clearly increased. She is 50% in range. 7% lows. Most of the lows are occurring overnight. This is actually occurring more frequently now.   She admits that because of the stress at work, she is drinking more alcohol (wine) and admits that the episodes of lows overnight are closely correlated with the alcohol intake. She is also stressed and so she and her  are eating a lot of fast food and high carbohydrate meals for dinner resulting in some hyperglycemia during the day and evening. She and her  just got back from [facility] and she said it was relaxing and so she feels like she is doing better than she was before. She is trying to learn the new pump and allowing the artificial intelligence to learn her. She does admit that she is still hesitant to put in the accurate amount of carb because she is worried about low. However she is beginning to appreciate the artificial intelligence and the avoidance of hypoglycemia that it provides. Examination  GENERAL: NCAT, Appears well nourished   EYES: EOMI, non-icteric, no proptosis   Ear/Nose/Throat: NCAT, no visible inflammation or masses   CARDIOVASCULAR: no cyanosis, no visible JVD   RESPIRATORY: comfortable respirations observed, no cyanosis   MUSCULOSKELETAL: Normal ROM of upper extremities observed   SKIN: No edema, rash, or other significant changes observed   NEUROLOGIC:  AAOx3   PSYCHIATRIC: Normal affect, Normal insight and judgement        Impression  1. Type 1 diabetes mellitus with deteriorating glucose control after transitioning from the OmniPod to the OmniPod 5 system  2. Anxiety    Plan:  1. I have asked her to reach out to [facility] with [facility] to see if there are other individuals are experiencing this disc and activity and lack of function  2. I have also asked her to ask [facility] to download the OmniPod data and send it to me so that I can review it  3. I will see her back in follow-up.

## 2022-12-24 DIAGNOSIS — E10.65 HYPERGLYCEMIA DUE TO TYPE 1 DIABETES MELLITUS (HCC): ICD-10-CM

## 2022-12-24 RX ORDER — INSULIN PMP CART,AUT,G6/7,CNTR
EACH SUBCUTANEOUS
Qty: 5 EACH | Refills: 3 | Status: SHIPPED | OUTPATIENT
Start: 2022-12-24

## 2022-12-27 DIAGNOSIS — E10.65 HYPERGLYCEMIA DUE TO TYPE 1 DIABETES MELLITUS (HCC): ICD-10-CM

## 2022-12-27 RX ORDER — INSULIN PMP CART,AUT,G6/7,CNTR
EACH SUBCUTANEOUS
Qty: 5 EACH | Refills: 3 | Status: SHIPPED | OUTPATIENT
Start: 2022-12-27

## 2023-04-03 ENCOUNTER — OFFICE VISIT (OUTPATIENT)
Dept: ENDOCRINOLOGY | Age: 48
End: 2023-04-03
Payer: COMMERCIAL

## 2023-04-03 DIAGNOSIS — E10.65 HYPERGLYCEMIA DUE TO TYPE 1 DIABETES MELLITUS (HCC): Primary | ICD-10-CM

## 2023-04-03 LAB — HBA1C MFR BLD HPLC: 7.3 %

## 2023-04-03 PROCEDURE — 3051F HG A1C>EQUAL 7.0%<8.0%: CPT | Performed by: INTERNAL MEDICINE

## 2023-04-03 PROCEDURE — 83036 HEMOGLOBIN GLYCOSYLATED A1C: CPT | Performed by: INTERNAL MEDICINE

## 2023-04-03 PROCEDURE — 99214 OFFICE O/P EST MOD 30 MIN: CPT | Performed by: INTERNAL MEDICINE

## 2023-04-03 NOTE — PROGRESS NOTES
This is a 69-year-old -American female with type 1 diabetes mellitus who I previously saw at [facility] for many years but last saw 6 years ago after she moved to [place]. She now returns to [place]. She recently got  and she and her  are now back in [place]. She now presents for follow-up. She continues on an Omni pod insulin pump and uses a Dexcom G6 system. Recently upgraded to the OmniPod 5 liked it at first but now she is more hesitant. She does not believe it is doing what it was supposed to do. She actually contacted [employer] and they sent her a new controller because they advised her that the controller and the sensor were not in sync as they are supposed to be. Tells me today that she thinks she pulled a muscle in her left anterior chest wall. She has been coughing for 6 weeks. She does not think that she cracked a rib. She also tells me that she was recently diagnosed with obstructive sleep apnea. She is elected to go with a mouthguard and not the mask. She also had a cardiac evaluation all of which was unremarkable. Settings on her pump are as follows  Midnight 0.65  Noon 0.75  6 PM 0.6  Bolus 1-12  Correction 50  Duration 3.5 hours     I downloaded her Dexcom system. She now has a A1c of 7.3%. She is 50% in range. 7% lows. Most of the lows are occurring overnight. This is actually occurring more frequently now. She admits that because of the stress at work, she is drinking more alcohol (wine) and admits that the episodes of lows overnight are closely correlated with the alcohol intake. She is also stressed and so she and her  are eating a lot of fast food and high carbohydrate meals for dinner resulting in some hyperglycemia during the day and evening. She and her  just got back from [facility] and she said it was relaxing and so she feels like she is doing better than she was before.   She is trying to learn the new pump and allowing the artificial intelligence to learn her. She does admit that she is still hesitant to put in the accurate amount of carb because she is worried about low. However she is beginning to appreciate the artificial intelligence and the avoidance of hypoglycemia that it provides. Examination  Blood pressure 140/85  Pulse 88 and regular  Weight 145  BMI 28.3  HEENT is unremarkable  Lungs are clear. There is point tenderness in the left anterior chest wall  Heart reveals a regular rate and rhythm without murmurs rubs or gallops  Abdomen benign  Extremities unremarkable  Diabetic foot exam:     Left Foot:   Visual Exam: normal    Pulse DP: 2+ (normal)   Filament test: normal sensation    Vibratory sensation: normal      Right Foot:   Visual Exam: normal    Pulse DP: 2+ (normal)   Filament test: normal sensation    Vibratory sensation: normal      Impression  1. Type 1 diabetes mellitus with a decreased A1c from 7.7% to 7.3% today  2. Left anterior chest wall tenderness likely a muscle pull. I cannot rule out the possibility of a rib fracture. There is no evidence of a pleuritic rub. I have ordered rib films. Plan:  1. I have asked the patient to register with glucose so that I can review the OmniPod data. 2.  I suspect that greater trust in the system will lead to even greater improvement in blood sugar control on the current system. We did discuss the possibility of going off connectivity between the Dexcom and just using the OmniPod as a OmniPod pump in manual mode. For now she will stay in auto mode. 3.  I will see her back in 3 to 4 months.

## 2023-04-06 ENCOUNTER — TELEPHONE (OUTPATIENT)
Dept: ENDOCRINOLOGY | Age: 48
End: 2023-04-06

## 2023-04-06 NOTE — TELEPHONE ENCOUNTER
Patient called wondering where to get the rib xray done. She said [facility] is closest. An Herron to go to [facility] o/p registration to register. She expressed understanding.

## 2023-04-07 ENCOUNTER — HOSPITAL ENCOUNTER (OUTPATIENT)
Dept: GENERAL RADIOLOGY | Age: 48
End: 2023-04-07
Payer: COMMERCIAL

## 2023-05-18 RX ORDER — ALPRAZOLAM 0.25 MG/1
0.25 TABLET ORAL
COMMUNITY
Start: 2021-06-23 | End: 2023-07-10 | Stop reason: SDUPTHER

## 2023-05-18 RX ORDER — INSULIN LISPRO 100 [IU]/ML
INJECTION, SOLUTION INTRAVENOUS; SUBCUTANEOUS
COMMUNITY
Start: 2023-02-17

## 2023-07-10 ENCOUNTER — OFFICE VISIT (OUTPATIENT)
Age: 48
End: 2023-07-10
Payer: COMMERCIAL

## 2023-07-10 VITALS
SYSTOLIC BLOOD PRESSURE: 138 MMHG | HEART RATE: 87 BPM | DIASTOLIC BLOOD PRESSURE: 84 MMHG | HEIGHT: 60 IN | BODY MASS INDEX: 28.66 KG/M2 | WEIGHT: 146 LBS

## 2023-07-10 DIAGNOSIS — E10.65 TYPE 1 DIABETES MELLITUS WITH HYPERGLYCEMIA (HCC): Primary | ICD-10-CM

## 2023-07-10 LAB — HBA1C MFR BLD: 7.6 %

## 2023-07-10 PROCEDURE — 83036 HEMOGLOBIN GLYCOSYLATED A1C: CPT | Performed by: INTERNAL MEDICINE

## 2023-07-10 PROCEDURE — 99214 OFFICE O/P EST MOD 30 MIN: CPT | Performed by: INTERNAL MEDICINE

## 2023-07-10 RX ORDER — PROCHLORPERAZINE 25 MG/1
SUPPOSITORY RECTAL
COMMUNITY
Start: 2023-06-14

## 2023-07-10 RX ORDER — INSULIN PMP CART,AUT,G6/7,CNTR
EACH SUBCUTANEOUS
COMMUNITY
Start: 2023-06-14

## 2023-07-10 RX ORDER — ALPRAZOLAM 0.25 MG/1
0.25 TABLET ORAL NIGHTLY PRN
Qty: 30 TABLET | Refills: 1 | Status: SHIPPED | OUTPATIENT
Start: 2023-07-10 | End: 2023-10-08

## 2023-07-10 NOTE — PROGRESS NOTES
This is a 58-year-old -American female with type 1 diabetes mellitus who I previously saw at [facility] for many years but last saw 6 years ago after she moved  to [place]. She now returns to [facility]. She recently got  and she and her  are now back in [facility]. She now presents for follow-up. She continues on an Omni pod insulin pump and uses a Dexcom G6 system. Recently upgraded to the OmniPod 5 liked it at first but now she is more hesitant. She does not believe it is doing what it was supposed to do. She actually contacted [employer] and they sent her a new controller because they advised her that the controller and the sensor were not in sync as they are supposed to be. She also tells me that she was recently diagnosed with obstructive sleep apnea. She is elected to go with a mouthguard and not the mask. She also had a cardiac evaluation all of which was unremarkable. Settings on her pump are as follows   Midnight 0.65   Noon 0.75   6 PM 0.6   Bolus 1-12   Correction 50   Duration 3.5 hours    Examination  Blood pressure 130/84  Pulse 88  Weight 146  BMI 28  HEENT unremarkable  Lungs clear  Heart is regular rate rhythm  Abdomen benign  Extremities unremarkable    Impression  1. Diabetes mellitus with an A1c of 7.6% on OmniPod 5/Dexcom system  2. Anxiety disorder    Plan:  1. I encouraged her again as I have in the past to be more aggressive about putting the carbs in the pump. If she does that, her A1c will come down nicely. 2.  I will see her back in 3 to 4 months.

## 2023-10-23 ENCOUNTER — OFFICE VISIT (OUTPATIENT)
Age: 48
End: 2023-10-23
Payer: COMMERCIAL

## 2023-10-23 VITALS
SYSTOLIC BLOOD PRESSURE: 147 MMHG | HEART RATE: 109 BPM | DIASTOLIC BLOOD PRESSURE: 87 MMHG | WEIGHT: 142 LBS | BODY MASS INDEX: 27.88 KG/M2 | HEIGHT: 60 IN

## 2023-10-23 DIAGNOSIS — Z96.41 PRESENCE OF INSULIN PUMP (EXTERNAL) (INTERNAL): ICD-10-CM

## 2023-10-23 DIAGNOSIS — E10.65 TYPE 1 DIABETES MELLITUS WITH HYPERGLYCEMIA (HCC): Primary | ICD-10-CM

## 2023-10-23 LAB — HBA1C MFR BLD: 7.1 %

## 2023-10-23 PROCEDURE — 99214 OFFICE O/P EST MOD 30 MIN: CPT | Performed by: INTERNAL MEDICINE

## 2023-10-23 PROCEDURE — 83036 HEMOGLOBIN GLYCOSYLATED A1C: CPT | Performed by: INTERNAL MEDICINE

## 2023-10-23 RX ORDER — INSULIN LISPRO 100 [IU]/ML
INJECTION, SOLUTION INTRAVENOUS; SUBCUTANEOUS
Qty: 1 EACH | Refills: 10 | Status: SHIPPED | OUTPATIENT
Start: 2023-10-23

## 2023-10-23 RX ORDER — ALPRAZOLAM 0.5 MG/1
0.5 TABLET ORAL NIGHTLY PRN
Qty: 30 TABLET | Refills: 1 | Status: SHIPPED | OUTPATIENT
Start: 2023-10-23 | End: 2023-11-22

## 2023-10-23 NOTE — PROGRESS NOTES
OmniPod 5/Dexcom system  2. Anxiety disorder    Plan:  1. I did not change the regimen. The system seems to be working very well for her. 2.  We did discuss transitions to alternate pump in the future but for now we did not change anything  3. I did obtain all screening laboratory tests will call her with results  4. I will see her back in 4 months    Please note that this dictation was completed with [employer], the computer voice recognition software. Quite often unanticipated grammatical, syntax, homophones, and other interpretive errors are inadvertently transcribed by the computer software. Please disregard these errors. Please excuse any errors that have escaped final proofreading.

## 2024-01-29 ENCOUNTER — OFFICE VISIT (OUTPATIENT)
Age: 49
End: 2024-01-29

## 2024-01-29 VITALS
WEIGHT: 146.8 LBS | HEART RATE: 102 BPM | HEIGHT: 60 IN | BODY MASS INDEX: 28.82 KG/M2 | DIASTOLIC BLOOD PRESSURE: 86 MMHG | SYSTOLIC BLOOD PRESSURE: 129 MMHG

## 2024-01-29 DIAGNOSIS — E10.65 TYPE 1 DIABETES MELLITUS WITH HYPERGLYCEMIA (HCC): Primary | ICD-10-CM

## 2024-01-29 LAB — HBA1C MFR BLD: 7.9 %

## 2024-03-05 ENCOUNTER — HOSPITAL ENCOUNTER (EMERGENCY)
Facility: HOSPITAL | Age: 49
Discharge: HOME | End: 2024-03-05
Attending: STUDENT IN AN ORGANIZED HEALTH CARE EDUCATION/TRAINING PROGRAM
Payer: COMMERCIAL

## 2024-03-05 ENCOUNTER — APPOINTMENT (OUTPATIENT)
Facility: HOSPITAL | Age: 49
End: 2024-03-05
Payer: COMMERCIAL

## 2024-03-05 VITALS
OXYGEN SATURATION: 95 % | WEIGHT: 148.15 LBS | HEART RATE: 92 BPM | RESPIRATION RATE: 16 BRPM | BODY MASS INDEX: 28.93 KG/M2 | DIASTOLIC BLOOD PRESSURE: 83 MMHG | SYSTOLIC BLOOD PRESSURE: 151 MMHG | TEMPERATURE: 98.5 F

## 2024-03-05 DIAGNOSIS — E86.0 DEHYDRATION: ICD-10-CM

## 2024-03-05 DIAGNOSIS — B34.9 VIRAL ILLNESS: ICD-10-CM

## 2024-03-05 DIAGNOSIS — R80.9 PROTEINURIA, UNSPECIFIED TYPE: Primary | ICD-10-CM

## 2024-03-05 LAB
ALBUMIN SERPL-MCNC: 3.5 G/DL (ref 3.5–5)
ALBUMIN/GLOB SERPL: 0.9 (ref 1.1–2.2)
ALP SERPL-CCNC: 129 U/L (ref 45–117)
ALT SERPL-CCNC: 51 U/L (ref 12–78)
ANION GAP SERPL CALC-SCNC: 7 MMOL/L (ref 5–15)
APPEARANCE UR: CLEAR
AST SERPL-CCNC: 66 U/L (ref 15–37)
BACTERIA URNS QL MICRO: NEGATIVE /HPF
BASOPHILS # BLD: 0 K/UL (ref 0–0.1)
BASOPHILS NFR BLD: 0 % (ref 0–1)
BILIRUB SERPL-MCNC: 0.8 MG/DL (ref 0.2–1)
BILIRUB UR QL: NEGATIVE
BUN SERPL-MCNC: 7 MG/DL (ref 6–20)
BUN/CREAT SERPL: 9 (ref 12–20)
CALCIUM SERPL-MCNC: 10 MG/DL (ref 8.5–10.1)
CHLORIDE SERPL-SCNC: 103 MMOL/L (ref 97–108)
CO2 SERPL-SCNC: 25 MMOL/L (ref 21–32)
COLOR UR: ABNORMAL
COMMENT:: NORMAL
CREAT SERPL-MCNC: 0.74 MG/DL (ref 0.55–1.02)
D DIMER PPP FEU-MCNC: <0.19 MG/L FEU (ref 0–0.65)
DIFFERENTIAL METHOD BLD: ABNORMAL
EKG ATRIAL RATE: 101 BPM
EKG DIAGNOSIS: NORMAL
EKG P AXIS: 70 DEGREES
EKG P-R INTERVAL: 128 MS
EKG Q-T INTERVAL: 344 MS
EKG QRS DURATION: 80 MS
EKG QTC CALCULATION (BAZETT): 446 MS
EKG R AXIS: 109 DEGREES
EKG T AXIS: 6 DEGREES
EKG VENTRICULAR RATE: 101 BPM
EOSINOPHIL # BLD: 0 K/UL (ref 0–0.4)
EOSINOPHIL NFR BLD: 0 % (ref 0–7)
EPITH CASTS URNS QL MICRO: ABNORMAL /LPF
ERYTHROCYTE [DISTWIDTH] IN BLOOD BY AUTOMATED COUNT: 13.4 % (ref 11.5–14.5)
FLUAV AG NPH QL IA: NEGATIVE
FLUBV AG NOSE QL IA: NEGATIVE
GLOBULIN SER CALC-MCNC: 3.8 G/DL (ref 2–4)
GLUCOSE SERPL-MCNC: 175 MG/DL (ref 65–100)
GLUCOSE UR STRIP.AUTO-MCNC: 100 MG/DL
HCT VFR BLD AUTO: 37.1 % (ref 35–47)
HGB BLD-MCNC: 13.3 G/DL (ref 11.5–16)
HGB UR QL STRIP: NEGATIVE
HYALINE CASTS URNS QL MICRO: ABNORMAL /LPF (ref 0–5)
IMM GRANULOCYTES # BLD AUTO: 0 K/UL (ref 0–0.04)
IMM GRANULOCYTES NFR BLD AUTO: 0 % (ref 0–0.5)
KETONES UR QL STRIP.AUTO: 15 MG/DL
LEUKOCYTE ESTERASE UR QL STRIP.AUTO: NEGATIVE
LYMPHOCYTES # BLD: 0.9 K/UL (ref 0.8–3.5)
LYMPHOCYTES NFR BLD: 9 % (ref 12–49)
MCH RBC QN AUTO: 32.4 PG (ref 26–34)
MCHC RBC AUTO-ENTMCNC: 35.8 G/DL (ref 30–36.5)
MCV RBC AUTO: 90.5 FL (ref 80–99)
MONOCYTES # BLD: 0.8 K/UL (ref 0–1)
MONOCYTES NFR BLD: 8 % (ref 5–13)
NEUTS SEG # BLD: 7.4 K/UL (ref 1.8–8)
NEUTS SEG NFR BLD: 81 % (ref 32–75)
NITRITE UR QL STRIP.AUTO: NEGATIVE
NRBC # BLD: 0 K/UL (ref 0–0.01)
NRBC BLD-RTO: 0 PER 100 WBC
PH UR STRIP: 5.5 (ref 5–8)
PLATELET # BLD AUTO: 282 K/UL (ref 150–400)
PMV BLD AUTO: 10.1 FL (ref 8.9–12.9)
POTASSIUM SERPL-SCNC: 3.6 MMOL/L (ref 3.5–5.1)
PROT SERPL-MCNC: 7.3 G/DL (ref 6.4–8.2)
PROT UR STRIP-MCNC: 30 MG/DL
RBC # BLD AUTO: 4.1 M/UL (ref 3.8–5.2)
RBC #/AREA URNS HPF: ABNORMAL /HPF (ref 0–5)
SODIUM SERPL-SCNC: 135 MMOL/L (ref 136–145)
SP GR UR REFRACTOMETRY: 1.02 (ref 1–1.03)
SPECIMEN HOLD: NORMAL
TROPONIN I SERPL HS-MCNC: 4 NG/L (ref 0–51)
URINE CULTURE IF INDICATED: ABNORMAL
UROBILINOGEN UR QL STRIP.AUTO: 0.2 EU/DL (ref 0.2–1)
WBC # BLD AUTO: 9.1 K/UL (ref 3.6–11)
WBC URNS QL MICRO: ABNORMAL /HPF (ref 0–4)

## 2024-03-05 PROCEDURE — 2580000003 HC RX 258: Performed by: NURSE PRACTITIONER

## 2024-03-05 PROCEDURE — 81001 URINALYSIS AUTO W/SCOPE: CPT

## 2024-03-05 PROCEDURE — 84484 ASSAY OF TROPONIN QUANT: CPT

## 2024-03-05 PROCEDURE — 36415 COLL VENOUS BLD VENIPUNCTURE: CPT

## 2024-03-05 PROCEDURE — 80053 COMPREHEN METABOLIC PANEL: CPT

## 2024-03-05 PROCEDURE — 93005 ELECTROCARDIOGRAM TRACING: CPT | Performed by: NURSE PRACTITIONER

## 2024-03-05 PROCEDURE — 85379 FIBRIN DEGRADATION QUANT: CPT

## 2024-03-05 PROCEDURE — 6360000002 HC RX W HCPCS: Performed by: NURSE PRACTITIONER

## 2024-03-05 PROCEDURE — 96361 HYDRATE IV INFUSION ADD-ON: CPT

## 2024-03-05 PROCEDURE — 87804 INFLUENZA ASSAY W/OPTIC: CPT

## 2024-03-05 PROCEDURE — 99285 EMERGENCY DEPT VISIT HI MDM: CPT

## 2024-03-05 PROCEDURE — 96375 TX/PRO/DX INJ NEW DRUG ADDON: CPT

## 2024-03-05 PROCEDURE — 71046 X-RAY EXAM CHEST 2 VIEWS: CPT

## 2024-03-05 PROCEDURE — 96374 THER/PROPH/DIAG INJ IV PUSH: CPT

## 2024-03-05 PROCEDURE — 85025 COMPLETE CBC W/AUTO DIFF WBC: CPT

## 2024-03-05 RX ORDER — PROCHLORPERAZINE EDISYLATE 5 MG/ML
10 INJECTION INTRAMUSCULAR; INTRAVENOUS ONCE
Status: COMPLETED | OUTPATIENT
Start: 2024-03-05 | End: 2024-03-05

## 2024-03-05 RX ORDER — KETOROLAC TROMETHAMINE 30 MG/ML
15 INJECTION, SOLUTION INTRAMUSCULAR; INTRAVENOUS
Status: COMPLETED | OUTPATIENT
Start: 2024-03-05 | End: 2024-03-05

## 2024-03-05 RX ORDER — 0.9 % SODIUM CHLORIDE 0.9 %
1000 INTRAVENOUS SOLUTION INTRAVENOUS ONCE
Status: COMPLETED | OUTPATIENT
Start: 2024-03-05 | End: 2024-03-05

## 2024-03-05 RX ADMIN — PROCHLORPERAZINE EDISYLATE 10 MG: 5 INJECTION INTRAMUSCULAR; INTRAVENOUS at 12:49

## 2024-03-05 RX ADMIN — SODIUM CHLORIDE 1000 ML: 9 INJECTION, SOLUTION INTRAVENOUS at 12:49

## 2024-03-05 RX ADMIN — KETOROLAC TROMETHAMINE 15 MG: 30 INJECTION, SOLUTION INTRAMUSCULAR; INTRAVENOUS at 12:49

## 2024-03-05 ASSESSMENT — PAIN DESCRIPTION - ONSET: ONSET: ON-GOING

## 2024-03-05 ASSESSMENT — PAIN SCALES - GENERAL: PAINLEVEL_OUTOF10: 4

## 2024-03-05 ASSESSMENT — PAIN - FUNCTIONAL ASSESSMENT
PAIN_FUNCTIONAL_ASSESSMENT: 0-10
PAIN_FUNCTIONAL_ASSESSMENT: ACTIVITIES ARE NOT PREVENTED

## 2024-03-05 ASSESSMENT — PAIN DESCRIPTION - PAIN TYPE: TYPE: ACUTE PAIN

## 2024-03-05 ASSESSMENT — PAIN DESCRIPTION - DESCRIPTORS: DESCRIPTORS: ACHING

## 2024-03-05 ASSESSMENT — PAIN DESCRIPTION - LOCATION: LOCATION: HEAD

## 2024-03-05 ASSESSMENT — PAIN DESCRIPTION - FREQUENCY: FREQUENCY: CONTINUOUS

## 2024-03-05 NOTE — ED PROVIDER NOTES
pain, costochondritis, cystitis     1. Previous notes (external to Emergency room) were reviewed: Chart Review    2.History was obtained by: Patient    3. Chronic medical issues affecting this visit: Type 1 DM    4. I ordered the following tests, followed by review of final reads by lab and radiology: cbc, cmp, trop, d-dimer, chest xray, EKG     5. I considered ordering: none    6. Medical intervention: saline bolus, toradol, compazine       Surgical intervention: None Indicated    7. Social determinants of health: None    8 Final diagnosis: dehydration, proteinuria, viral illness. Medications prescribed: Tylenol, ibuprofen as needed    9. Disposition: Discharge to home and follow up with PCP, return to the emergency room with worsening symptoms. Patient in agreement with plan of care.      Amount and/or Complexity of Data Reviewed  Labs: ordered. Decision-making details documented in ED Course.  Radiology: ordered. Decision-making details documented in ED Course.  ECG/medicine tests: ordered.    Risk  Prescription drug management.            REASSESSMENT     ED Course as of 03/05/24 1548   Tue Mar 05, 2024   1155 EKG at 11:41 AM-sinus tachycardia, nonspecific T wave abnormality, 101 bpm, no STEMI. [JM]   1318 Troponin:    Troponin, High Sensitivity 4  4   [AF]   1318 Rapid influenza A/B antigens:    Influenza A Ag Negative   Influenza B Ag Negative  negative [AF]   1318 D-Dimer, Quantitative:    D-Dimer, Quant <0.19  <0.19 [AF]   1318 CMP(!):    Sodium 135(!)   Potassium 3.6   Chloride 103   CO2 25   Anion Gap 7   Glucose, Random 175(!)   BUN,BUNPL 7   Creatinine 0.74   Bun/Cre Ratio 9(!)   Est, Glom Filt Rate >60   CALCIUM, SERUM, 180217 10.0   BILIRUBIN TOTAL 0.8   ALT 51   AST 66(!)   Alk Phos 129(!)   Total Protein 7.3   Albumin 3.5   Globulin 3.8   ALBUMIN/GLOBULIN RATIO 0.9(!)  Na 135 [AF]   1318 Glucose, Random(!): 175 [AF]   1318 AST(!): 66 [AF]   1318 Alk Phos(!): 129 [AF]   1319 XR CHEST (2 VW)  Normal PA

## 2024-03-05 NOTE — DISCHARGE INSTRUCTIONS
You may take Tylenol or ibuprofen as needed for any pain discomfort.  Increase your oral fluids to maintain hydration.  Your blood work noted a small amount of protein in your urine, otherwise your lab work was reassuring.  Please call and schedule a follow-up appointment with your primary care provider.

## 2024-03-05 NOTE — ED TRIAGE NOTES
Patient arrived ambulatory to triage with fever and inflammation in hip. 102 temp at home with tylenol taken at 4 am. She is concerned because fever had been off and on for one month. She had nausea and headache yesterday. BS at home 120.

## 2024-03-13 ENCOUNTER — TELEPHONE (OUTPATIENT)
Age: 49
End: 2024-03-13

## 2024-03-13 NOTE — TELEPHONE ENCOUNTER
----- Message from Marie Pau sent at 3/13/2024  8:38 AM EDT -----  Subject: Appointment Request    Reason for Call: Established Patient Appointment needed: New Patient   Request Appointment    QUESTIONS    Reason for appointment request? No appointments available during search     Additional Information for Provider? Madhuri Anderson would like to   establish care with Dr. Susanne Dotson her Endocrinology Dr. Renee has   recommended her. Please reach out to schedule an appointment.  ---------------------------------------------------------------------------  --------------  CALL BACK INFO  [phone]; OK to leave message on voicemail  ---------------------------------------------------------------------------  --------------  SCRIPT ANSWERS

## 2024-04-25 DIAGNOSIS — E10.65 TYPE 1 DIABETES MELLITUS WITH HYPERGLYCEMIA (HCC): ICD-10-CM

## 2024-04-25 RX ORDER — INSULIN LISPRO 100 [IU]/ML
INJECTION, SOLUTION INTRAVENOUS; SUBCUTANEOUS
Qty: 5 EACH | Refills: 10 | Status: SHIPPED | OUTPATIENT
Start: 2024-04-25

## 2024-04-25 NOTE — TELEPHONE ENCOUNTER
Requested Prescriptions     Pending Prescriptions Disp Refills    insulin lispro (HUMALOG) 100 UNIT/ML SOLN injection vial 1 each 10     Sig: ADMINISTER 50 UNITS DAILY VIA INSULIN PUMP

## 2024-05-24 ENCOUNTER — OFFICE VISIT (OUTPATIENT)
Age: 49
End: 2024-05-24

## 2024-05-24 VITALS
BODY MASS INDEX: 28.98 KG/M2 | DIASTOLIC BLOOD PRESSURE: 83 MMHG | HEART RATE: 87 BPM | WEIGHT: 147.6 LBS | HEIGHT: 60 IN | SYSTOLIC BLOOD PRESSURE: 126 MMHG

## 2024-05-24 DIAGNOSIS — E10.65 TYPE 1 DIABETES MELLITUS WITH HYPERGLYCEMIA (HCC): Primary | ICD-10-CM

## 2024-05-24 LAB — HBA1C MFR BLD: 7.4 %

## 2024-05-24 RX ORDER — ALPRAZOLAM 0.25 MG/1
0.25 TABLET ORAL 3 TIMES DAILY PRN
Qty: 30 TABLET | Refills: 1 | Status: SHIPPED | OUTPATIENT
Start: 2024-05-24 | End: 2024-08-22

## 2024-05-24 RX ORDER — ALPRAZOLAM 0.25 MG/1
0.25 TABLET ORAL PRN
COMMUNITY
End: 2024-05-24 | Stop reason: SDUPTHER

## 2024-05-24 NOTE — PROGRESS NOTES
This is a 49-year-old -American female with type 1 diabetes mellitus who I previously saw at [facility] for many years but last saw 6 years ago after she moved  to [place].  She now returns to [place].  She recently got  and she and her  are now back in [place].  She now presents for follow-up.       She continues on an Omni pod insulin pump and uses a Dexcom G6 system.  Recently upgraded to the OmniPod 5 liked it at first but now she is more hesitant.  She does not believe it is doing what it was supposed to do.  She actually contacted [employer] and they sent her a new controller because they advised her that the controller and the sensor were not in sync as they are supposed to be.       She also tells me that she was recently diagnosed with obstructive sleep apnea.  She is elected to go with a mouthguard and not the mask.   She also had a cardiac evaluation all of which was unremarkable.      Settings on her pump are as follows   Midnight 0.65   Noon 0.75   6 PM 0.6   Bolus 1-12   Correction 50   Duration 3.5 hours    When I saw her last her A1c was 7.9%.  She presents today with an A1c of 7.4%.  There is a little less stress in her life and that reflects her blood sugars.  Her diet is really unchanged.  She is trying to go for walks with a girlfriend several days a week.  Breakfast is usually banana and toast.  Lunch is a salad.  Dinner can frequently be a sandwich.  She eats a lot on the on the go which contributes to some higher blood sugars.    Examination  Blood pressure 126/83  Pulse 87  Weight 67 kg  BMI 28.8  HEENT unremarkable  Lungs clear  Heart reveals a regular rate and rhythm  Abdomen benign  Extremities unremarkable  Diabetic foot exam:   Left Foot:   Visual Exam: normal   Pulse DP: 2+ (normal)   Filament test: normal sensation   Vibratory Sensation: normal  Right Foot:   Visual Exam: normal   Pulse DP: 2+ (normal)   Filament test: normal sensation   Vibratory Sensation: normal

## 2024-07-25 DIAGNOSIS — E10.65 TYPE 1 DIABETES MELLITUS WITH HYPERGLYCEMIA (HCC): ICD-10-CM

## 2024-07-25 RX ORDER — INSULIN LISPRO 100 [IU]/ML
INJECTION, SOLUTION INTRAVENOUS; SUBCUTANEOUS
Qty: 5 EACH | Refills: 3 | Status: SHIPPED | OUTPATIENT
Start: 2024-07-25

## 2024-07-25 RX ORDER — PROCHLORPERAZINE 25 MG/1
SUPPOSITORY RECTAL
Qty: 9 EACH | Refills: 3 | Status: SHIPPED | OUTPATIENT
Start: 2024-07-25

## 2024-07-25 RX ORDER — PROCHLORPERAZINE 25 MG/1
SUPPOSITORY RECTAL
Qty: 1 EACH | Refills: 3 | Status: SHIPPED | OUTPATIENT
Start: 2024-07-25

## 2024-07-25 RX ORDER — INSULIN PMP CART,AUT,G6/7,CNTR
EACH SUBCUTANEOUS
Qty: 9 EACH | Refills: 3 | Status: SHIPPED | OUTPATIENT
Start: 2024-07-25

## 2024-07-31 ENCOUNTER — TELEPHONE (OUTPATIENT)
Age: 49
End: 2024-07-31

## 2024-07-31 NOTE — TELEPHONE ENCOUNTER
Pt LVM asking if the authorization for pt's Omnipog and Dexcom.  Informed pt that Nurse Alejo covering for Nurse Arleth while she is out, completed authorization and was notified that a PA was not needed. Also informed pt that [employer] was contacted and they stated they received a paid claim for both. Pt verbalized understanding.

## 2024-09-23 ENCOUNTER — OFFICE VISIT (OUTPATIENT)
Age: 49
End: 2024-09-23

## 2024-09-23 VITALS
BODY MASS INDEX: 28 KG/M2 | HEART RATE: 85 BPM | WEIGHT: 142.6 LBS | OXYGEN SATURATION: 98 % | SYSTOLIC BLOOD PRESSURE: 139 MMHG | HEIGHT: 60 IN | DIASTOLIC BLOOD PRESSURE: 95 MMHG

## 2024-09-23 DIAGNOSIS — E10.65 TYPE 1 DIABETES MELLITUS WITH HYPERGLYCEMIA (HCC): Primary | ICD-10-CM

## 2024-09-23 LAB — HBA1C MFR BLD: 6.8 %

## 2024-09-29 RX ORDER — INSULIN PMP CART,AUT,G6/7,CNTR
EACH SUBCUTANEOUS
Qty: 30 EACH | Refills: 3 | Status: SHIPPED | OUTPATIENT
Start: 2024-09-29

## 2025-01-14 RX ORDER — INSULIN PMP CART,AUT,G6/7,CNTR
EACH SUBCUTANEOUS
Qty: 30 EACH | Refills: 3 | Status: SHIPPED | OUTPATIENT
Start: 2025-01-14

## 2025-01-24 ENCOUNTER — OFFICE VISIT (OUTPATIENT)
Age: 50
End: 2025-01-24

## 2025-01-24 VITALS
BODY MASS INDEX: 29.21 KG/M2 | SYSTOLIC BLOOD PRESSURE: 155 MMHG | HEIGHT: 60 IN | WEIGHT: 148.8 LBS | HEART RATE: 89 BPM | DIASTOLIC BLOOD PRESSURE: 97 MMHG

## 2025-01-24 DIAGNOSIS — E10.65 TYPE 1 DIABETES MELLITUS WITH HYPERGLYCEMIA (HCC): Primary | ICD-10-CM

## 2025-01-24 LAB — HBA1C MFR BLD: 7.2 %

## 2025-01-24 RX ORDER — INSULIN LISPRO 100 [IU]/ML
INJECTION, SOLUTION INTRAVENOUS; SUBCUTANEOUS
Qty: 5 EACH | Refills: 3 | Status: SHIPPED | OUTPATIENT
Start: 2025-01-24

## 2025-01-24 RX ORDER — ALPRAZOLAM 0.25 MG/1
0.25 TABLET ORAL NIGHTLY PRN
COMMUNITY

## 2025-01-24 NOTE — PROGRESS NOTES
This is a 50-year-old -American female with type 1 diabetes mellitus who I previously saw at [facility] for many years but last saw 6 years ago after she moved  to [place].  She and her  are now back in [place].  She now presents for follow-up.       She continues on an Omni pod insulin pump and uses a Dexcom G6 system.  Recently upgraded to the OmniPod 5 liked it at first but now she is more hesitant.  She does not believe it is doing what it was supposed to do.  She actually contacted [employer] and they sent her a new controller because they advised her that the controller and the sensor were not in sync as they are supposed to be.       She also tells me that she was recently diagnosed with obstructive sleep apnea.  She is elected to go with a mouthguard and not the mask.   She also had a cardiac evaluation all of which was unremarkable.      Settings on her pump are as follows   Midnight 0.65   Noon 0.75   6 PM 0.6   Bolus 1-12   Correction 50   Duration 3.5 hours      When I saw her last her A1c was 6.8%.  She presents today with an A1c of 7.2%.  There is a little less stress in her life and that reflects her blood sugars.  Part of that reduction in stress is that she stopped working at [employer].  Her diet is really unchanged.  She is trying to go for walks with a girlfriend several days a week.  Breakfast is usually banana and toast.  Lunch is a salad.  Dinner can frequently be a sandwich.    What is new since I saw her last is that she had a partial tear in her left shoulder when she was lifting something and she is now undergoing PT.  Her blood sugar rises every day at around 11:00 in the morning when she goes for PT.  She has not received steroids and no surgery is planned.    Examination  Blood pressure 155/97  Pulse 89  Weight 67 kg  BMI 29.0  HEENT unremarkable  Lungs clear  Heart reveals a regular rate and rhythm  Abdomen benign  Extremities unremarkable      Impression  1.  Type 1 diabetes

## 2025-04-28 ENCOUNTER — OFFICE VISIT (OUTPATIENT)
Age: 50
End: 2025-04-28
Payer: COMMERCIAL

## 2025-04-28 VITALS
WEIGHT: 150 LBS | HEART RATE: 100 BPM | BODY MASS INDEX: 29.45 KG/M2 | HEIGHT: 60 IN | DIASTOLIC BLOOD PRESSURE: 86 MMHG | SYSTOLIC BLOOD PRESSURE: 135 MMHG

## 2025-04-28 DIAGNOSIS — E10.65 TYPE 1 DIABETES MELLITUS WITH HYPERGLYCEMIA (HCC): Primary | ICD-10-CM

## 2025-04-28 LAB — HBA1C MFR BLD: 7 %

## 2025-04-28 PROCEDURE — 3051F HG A1C>EQUAL 7.0%<8.0%: CPT | Performed by: INTERNAL MEDICINE

## 2025-04-28 PROCEDURE — 95251 CONT GLUC MNTR ANALYSIS I&R: CPT | Performed by: INTERNAL MEDICINE

## 2025-04-28 PROCEDURE — 83036 HEMOGLOBIN GLYCOSYLATED A1C: CPT | Performed by: INTERNAL MEDICINE

## 2025-04-28 PROCEDURE — 99214 OFFICE O/P EST MOD 30 MIN: CPT | Performed by: INTERNAL MEDICINE

## 2025-04-28 NOTE — PROGRESS NOTES
This is a 50-year-old -American female with type 1 diabetes mellitus who I previously saw at [facility] for many years but last saw 6 years ago after she moved  to [place].  She and her  are now back in [place].  She now presents for follow-up.       She continues on an Omni pod insulin pump and uses a Dexcom G6 system.  Recently upgraded to the OmniPod 5 liked it at first but now she is more hesitant.  She does not believe it is doing what it was supposed to do.  She actually contacted [employer] and they sent her a new controller because they advised her that the controller and the sensor were not in sync as they are supposed to be.       She also tells me that she was recently diagnosed with obstructive sleep apnea.  She is elected to go with a mouthguard and not the mask.   She also had a cardiac evaluation all of which was unremarkable.      Settings on her pump are as follows   Midnight 0.65   Noon 0.75   6 PM 0.6   Bolus 1-12   Correction 50   Duration 3.5 hours      When I saw her last her A1c was 6.8%.  She presents today with an A1c of 7.0%.  There is a little less stress in her life and that reflects her blood sugars.  Part of that reduction in stress is that she stopped working at [employer].  Her diet is really unchanged.  She is trying to go for walks with a girlfriend several days a week.  Breakfast is usually banana and toast.  Lunch is a salad.  Dinner can frequently be a sandwich.    Examination  Blood pressure 135/86  Pulse 100  Weight 68 kg  BMI 29.3  HEENT unremarkable  Lungs clear  Heart reveals a regular rate and rhythm  Abdomen benign  Extremities unremarkable  Diabetic foot exam:   Left Foot:   Visual Exam: normal   Pulse DP: 2+ (normal)   Filament test: normal sensation   Vibratory Sensation: normal  Right Foot:   Visual Exam: normal   Pulse DP: 2+ (normal)   Filament test: normal sensation   Vibratory Sensation: normal     Impression  1.  Type 1 diabetes mellitus with overall improving

## 2025-07-03 DIAGNOSIS — E10.65 TYPE 1 DIABETES MELLITUS WITH HYPERGLYCEMIA (HCC): ICD-10-CM

## 2025-07-04 RX ORDER — INSULIN PMP CART,AUT,G6/7,CNTR
EACH SUBCUTANEOUS
Qty: 30 EACH | Refills: 3 | Status: SHIPPED | OUTPATIENT
Start: 2025-07-04

## 2025-07-04 RX ORDER — PROCHLORPERAZINE 25 MG/1
SUPPOSITORY RECTAL
Qty: 9 EACH | Refills: 3 | Status: SHIPPED | OUTPATIENT
Start: 2025-07-04

## 2025-07-04 RX ORDER — INSULIN LISPRO 100 [IU]/ML
INJECTION, SOLUTION INTRAVENOUS; SUBCUTANEOUS
Qty: 50 ML | Refills: 2 | Status: SHIPPED | OUTPATIENT
Start: 2025-07-04

## 2025-07-09 ENCOUNTER — TELEPHONE (OUTPATIENT)
Age: 50
End: 2025-07-09

## 2025-07-24 NOTE — TELEPHONE ENCOUNTER
Requested Prescriptions     Pending Prescriptions Disp Refills    ALPRAZolam (XANAX) 0.25 MG tablet 30 tablet 1     Sig: Take 1 tablet by mouth 3 times daily as needed for Anxiety. Max Daily Amount: 0.75 mg

## 2025-07-29 RX ORDER — ALPRAZOLAM 0.25 MG
0.25 TABLET ORAL 3 TIMES DAILY PRN
Qty: 30 TABLET | Refills: 1 | OUTPATIENT
Start: 2025-07-29

## 2025-08-05 DIAGNOSIS — E10.65 TYPE 1 DIABETES MELLITUS WITH HYPERGLYCEMIA (HCC): Primary | ICD-10-CM

## 2025-08-05 RX ORDER — ALPRAZOLAM 0.25 MG
0.25 TABLET ORAL NIGHTLY PRN
Qty: 90 TABLET | Refills: 0 | Status: SHIPPED | OUTPATIENT
Start: 2025-08-05 | End: 2025-11-03

## 2025-08-14 ENCOUNTER — INITIAL CONSULT (OUTPATIENT)
Age: 50
End: 2025-08-14

## 2025-08-14 ENCOUNTER — CLINICAL DOCUMENTATION (OUTPATIENT)
Age: 50
End: 2025-08-14

## 2025-08-14 ENCOUNTER — CLINICAL DOCUMENTATION (OUTPATIENT)
Facility: HOSPITAL | Age: 50
End: 2025-08-14

## 2025-08-14 VITALS
WEIGHT: 149.2 LBS | OXYGEN SATURATION: 97 % | DIASTOLIC BLOOD PRESSURE: 98 MMHG | HEART RATE: 90 BPM | SYSTOLIC BLOOD PRESSURE: 162 MMHG | TEMPERATURE: 98.3 F | RESPIRATION RATE: 16 BRPM | HEIGHT: 60 IN | BODY MASS INDEX: 29.29 KG/M2

## 2025-08-14 DIAGNOSIS — Z79.899 HIGH RISK MEDICATION USE: ICD-10-CM

## 2025-08-14 DIAGNOSIS — Z51.12 ENCOUNTER FOR ANTINEOPLASTIC IMMUNOTHERAPY: ICD-10-CM

## 2025-08-14 DIAGNOSIS — R03.0 ELEVATED BLOOD PRESSURE READING: ICD-10-CM

## 2025-08-14 DIAGNOSIS — G89.3 CANCER RELATED PAIN: ICD-10-CM

## 2025-08-14 DIAGNOSIS — R45.89 ANXIETY ABOUT HEALTH: ICD-10-CM

## 2025-08-14 DIAGNOSIS — C90.00 MULTIPLE MYELOMA NOT HAVING ACHIEVED REMISSION (HCC): Primary | ICD-10-CM

## 2025-08-14 ASSESSMENT — LIFESTYLE VARIABLES: HOW MANY STANDARD DRINKS CONTAINING ALCOHOL DO YOU HAVE ON A TYPICAL DAY: 1 OR 2

## 2025-08-15 ENCOUNTER — TELEPHONE (OUTPATIENT)
Age: 50
End: 2025-08-15

## 2025-08-15 DIAGNOSIS — C90.00 MULTIPLE MYELOMA NOT HAVING ACHIEVED REMISSION (HCC): Primary | ICD-10-CM

## 2025-08-15 RX ORDER — SULFAMETHOXAZOLE AND TRIMETHOPRIM 800; 160 MG/1; MG/1
1 TABLET ORAL
Qty: 20 TABLET | Refills: 5 | Status: CANCELLED | OUTPATIENT
Start: 2025-08-15 | End: 2025-09-14

## 2025-08-15 RX ORDER — LENALIDOMIDE 25 MG/1
25 CAPSULE ORAL SEE ADMIN INSTRUCTIONS
Qty: 21 CAPSULE | Refills: 0 | Status: SHIPPED | OUTPATIENT
Start: 2025-08-15

## 2025-08-19 ENCOUNTER — PATIENT MESSAGE (OUTPATIENT)
Age: 50
End: 2025-08-19

## 2025-08-19 ENCOUNTER — TELEPHONE (OUTPATIENT)
Age: 50
End: 2025-08-19

## 2025-08-20 DIAGNOSIS — C90.00 MULTIPLE MYELOMA NOT HAVING ACHIEVED REMISSION (HCC): ICD-10-CM

## 2025-08-20 RX ORDER — ACYCLOVIR 400 MG/1
400 TABLET ORAL 2 TIMES DAILY
Qty: 60 TABLET | Refills: 2 | Status: SHIPPED | OUTPATIENT
Start: 2025-08-20 | End: 2025-09-19

## 2025-08-20 RX ORDER — PROCHLORPERAZINE MALEATE 10 MG
5 TABLET ORAL EVERY 6 HOURS PRN
Qty: 40 TABLET | Refills: 1 | Status: SHIPPED | OUTPATIENT
Start: 2025-08-20

## 2025-08-20 RX ORDER — LENALIDOMIDE 25 MG/1
25 CAPSULE ORAL SEE ADMIN INSTRUCTIONS
Qty: 21 CAPSULE | Refills: 0 | Status: ACTIVE | OUTPATIENT
Start: 2025-08-20

## 2025-08-20 RX ORDER — DEXAMETHASONE 4 MG/1
20 TABLET ORAL WEEKLY
Qty: 20 TABLET | Refills: 5 | Status: SHIPPED | OUTPATIENT
Start: 2025-08-20

## 2025-08-20 RX ORDER — ONDANSETRON 4 MG/1
8 TABLET, ORALLY DISINTEGRATING ORAL 3 TIMES DAILY PRN
Qty: 21 TABLET | Refills: 3 | Status: SHIPPED | OUTPATIENT
Start: 2025-08-20

## 2025-08-20 RX ORDER — ASPIRIN 81 MG/1
81 TABLET ORAL DAILY
Qty: 90 TABLET | Refills: 1 | Status: SHIPPED | OUTPATIENT
Start: 2025-08-20

## 2025-08-21 ENCOUNTER — TELEPHONE (OUTPATIENT)
Age: 50
End: 2025-08-21

## 2025-08-21 ENCOUNTER — PATIENT MESSAGE (OUTPATIENT)
Age: 50
End: 2025-08-21

## 2025-08-21 RX ORDER — SULFAMETHOXAZOLE AND TRIMETHOPRIM 800; 160 MG/1; MG/1
1 TABLET ORAL
Qty: 20 TABLET | Refills: 5 | Status: SHIPPED | OUTPATIENT
Start: 2025-08-22 | End: 2025-09-21

## 2025-08-22 ENCOUNTER — HOSPITAL ENCOUNTER (OUTPATIENT)
Facility: HOSPITAL | Age: 50
Setting detail: INFUSION SERIES
End: 2025-08-22

## 2025-08-28 DIAGNOSIS — C90.00 MULTIPLE MYELOMA NOT HAVING ACHIEVED REMISSION (HCC): Primary | ICD-10-CM

## 2025-08-28 RX ORDER — SODIUM CHLORIDE 9 MG/ML
5-250 INJECTION, SOLUTION INTRAVENOUS PRN
Status: CANCELLED | OUTPATIENT
Start: 2025-08-29

## 2025-08-28 RX ORDER — HEPARIN SODIUM (PORCINE) LOCK FLUSH IV SOLN 100 UNIT/ML 100 UNIT/ML
500 SOLUTION INTRAVENOUS PRN
OUTPATIENT
Start: 2025-09-12

## 2025-08-28 RX ORDER — ACETAMINOPHEN 325 MG/1
650 TABLET ORAL ONCE
OUTPATIENT
Start: 2025-09-19 | End: 2025-09-19

## 2025-08-28 RX ORDER — DIPHENHYDRAMINE HCL 25 MG
25 TABLET ORAL ONCE
OUTPATIENT
Start: 2025-09-12 | End: 2025-09-12

## 2025-08-28 RX ORDER — FAMOTIDINE 20 MG/1
20 TABLET, FILM COATED ORAL ONCE
Status: CANCELLED | OUTPATIENT
Start: 2025-08-29 | End: 2025-08-29

## 2025-08-28 RX ORDER — ACETAMINOPHEN 325 MG/1
650 TABLET ORAL ONCE
OUTPATIENT
Start: 2025-09-05 | End: 2025-09-05

## 2025-08-28 RX ORDER — HEPARIN SODIUM (PORCINE) LOCK FLUSH IV SOLN 100 UNIT/ML 100 UNIT/ML
500 SOLUTION INTRAVENOUS PRN
Status: CANCELLED | OUTPATIENT
Start: 2025-08-29

## 2025-08-28 RX ORDER — ALBUTEROL SULFATE 90 UG/1
4 INHALANT RESPIRATORY (INHALATION) PRN
OUTPATIENT
Start: 2025-09-12

## 2025-08-28 RX ORDER — ONDANSETRON 4 MG/1
8 TABLET, ORALLY DISINTEGRATING ORAL
Status: CANCELLED | OUTPATIENT
Start: 2025-08-29

## 2025-08-28 RX ORDER — ONDANSETRON 2 MG/ML
8 INJECTION INTRAMUSCULAR; INTRAVENOUS
Status: CANCELLED | OUTPATIENT
Start: 2025-08-29

## 2025-08-28 RX ORDER — SODIUM CHLORIDE 0.9 % (FLUSH) 0.9 %
5-40 SYRINGE (ML) INJECTION PRN
OUTPATIENT
Start: 2025-09-05

## 2025-08-28 RX ORDER — DIPHENHYDRAMINE HYDROCHLORIDE 50 MG/ML
50 INJECTION, SOLUTION INTRAMUSCULAR; INTRAVENOUS
Status: CANCELLED | OUTPATIENT
Start: 2025-08-29

## 2025-08-28 RX ORDER — SODIUM CHLORIDE 0.9 % (FLUSH) 0.9 %
5-40 SYRINGE (ML) INJECTION PRN
Status: CANCELLED | OUTPATIENT
Start: 2025-08-29

## 2025-08-28 RX ORDER — SODIUM CHLORIDE 0.9 % (FLUSH) 0.9 %
5-40 SYRINGE (ML) INJECTION PRN
OUTPATIENT
Start: 2025-09-12

## 2025-08-28 RX ORDER — ACETAMINOPHEN 325 MG/1
650 TABLET ORAL
OUTPATIENT
Start: 2025-09-12

## 2025-08-28 RX ORDER — ONDANSETRON 4 MG/1
8 TABLET, ORALLY DISINTEGRATING ORAL
OUTPATIENT
Start: 2025-09-19

## 2025-08-28 RX ORDER — HYDROCORTISONE SODIUM SUCCINATE 100 MG/2ML
100 INJECTION INTRAMUSCULAR; INTRAVENOUS
Status: CANCELLED | OUTPATIENT
Start: 2025-08-29

## 2025-08-28 RX ORDER — HEPARIN SODIUM (PORCINE) LOCK FLUSH IV SOLN 100 UNIT/ML 100 UNIT/ML
500 SOLUTION INTRAVENOUS PRN
OUTPATIENT
Start: 2025-09-05

## 2025-08-28 RX ORDER — ALBUTEROL SULFATE 90 UG/1
4 INHALANT RESPIRATORY (INHALATION) PRN
OUTPATIENT
Start: 2025-09-19

## 2025-08-28 RX ORDER — ONDANSETRON 2 MG/ML
8 INJECTION INTRAMUSCULAR; INTRAVENOUS
OUTPATIENT
Start: 2025-09-19

## 2025-08-28 RX ORDER — HYDROCORTISONE SODIUM SUCCINATE 100 MG/2ML
100 INJECTION INTRAMUSCULAR; INTRAVENOUS
OUTPATIENT
Start: 2025-09-19

## 2025-08-28 RX ORDER — FAMOTIDINE 10 MG/ML
20 INJECTION, SOLUTION INTRAVENOUS
OUTPATIENT
Start: 2025-09-19

## 2025-08-28 RX ORDER — DIPHENHYDRAMINE HCL 25 MG
25 TABLET ORAL ONCE
Status: CANCELLED | OUTPATIENT
Start: 2025-08-29 | End: 2025-08-29

## 2025-08-28 RX ORDER — ONDANSETRON 4 MG/1
8 TABLET, ORALLY DISINTEGRATING ORAL
OUTPATIENT
Start: 2025-09-05

## 2025-08-28 RX ORDER — SODIUM CHLORIDE 9 MG/ML
INJECTION, SOLUTION INTRAVENOUS PRN
Status: CANCELLED | OUTPATIENT
Start: 2025-08-29

## 2025-08-28 RX ORDER — FAMOTIDINE 10 MG/ML
20 INJECTION, SOLUTION INTRAVENOUS
OUTPATIENT
Start: 2025-09-05

## 2025-08-28 RX ORDER — SODIUM CHLORIDE 9 MG/ML
5-250 INJECTION, SOLUTION INTRAVENOUS PRN
OUTPATIENT
Start: 2025-09-12

## 2025-08-28 RX ORDER — ONDANSETRON 2 MG/ML
8 INJECTION INTRAMUSCULAR; INTRAVENOUS
OUTPATIENT
Start: 2025-09-12

## 2025-08-28 RX ORDER — DIPHENHYDRAMINE HYDROCHLORIDE 50 MG/ML
50 INJECTION, SOLUTION INTRAMUSCULAR; INTRAVENOUS
OUTPATIENT
Start: 2025-09-19

## 2025-08-28 RX ORDER — DEXAMETHASONE 0.5 MG/1
20 TABLET ORAL ONCE
Status: CANCELLED | OUTPATIENT
Start: 2025-08-29 | End: 2025-08-29

## 2025-08-28 RX ORDER — EPINEPHRINE 1 MG/ML
0.3 INJECTION, SOLUTION, CONCENTRATE INTRAVENOUS PRN
OUTPATIENT
Start: 2025-09-05

## 2025-08-28 RX ORDER — SODIUM CHLORIDE 9 MG/ML
5-250 INJECTION, SOLUTION INTRAVENOUS PRN
OUTPATIENT
Start: 2025-09-05

## 2025-08-28 RX ORDER — EPINEPHRINE 1 MG/ML
0.3 INJECTION, SOLUTION, CONCENTRATE INTRAVENOUS PRN
OUTPATIENT
Start: 2025-09-19

## 2025-08-28 RX ORDER — DEXAMETHASONE 0.5 MG/1
20 TABLET ORAL ONCE
OUTPATIENT
Start: 2025-09-12 | End: 2025-09-12

## 2025-08-28 RX ORDER — ACETAMINOPHEN 325 MG/1
650 TABLET ORAL
OUTPATIENT
Start: 2025-09-19

## 2025-08-28 RX ORDER — SODIUM CHLORIDE 9 MG/ML
INJECTION, SOLUTION INTRAVENOUS PRN
OUTPATIENT
Start: 2025-09-19

## 2025-08-28 RX ORDER — DEXAMETHASONE 0.5 MG/1
20 TABLET ORAL ONCE
OUTPATIENT
Start: 2025-09-19 | End: 2025-09-19

## 2025-08-28 RX ORDER — SODIUM CHLORIDE 0.9 % (FLUSH) 0.9 %
5-40 SYRINGE (ML) INJECTION PRN
OUTPATIENT
Start: 2025-09-19

## 2025-08-28 RX ORDER — FAMOTIDINE 10 MG/ML
20 INJECTION, SOLUTION INTRAVENOUS
OUTPATIENT
Start: 2025-09-12

## 2025-08-28 RX ORDER — ALBUTEROL SULFATE 90 UG/1
4 INHALANT RESPIRATORY (INHALATION) PRN
OUTPATIENT
Start: 2025-09-05

## 2025-08-28 RX ORDER — ONDANSETRON 4 MG/1
8 TABLET, ORALLY DISINTEGRATING ORAL
OUTPATIENT
Start: 2025-09-12

## 2025-08-28 RX ORDER — MONTELUKAST SODIUM 10 MG/1
10 TABLET ORAL ONCE
Status: CANCELLED | OUTPATIENT
Start: 2025-08-29 | End: 2025-08-29

## 2025-08-28 RX ORDER — ACETAMINOPHEN 325 MG/1
650 TABLET ORAL ONCE
OUTPATIENT
Start: 2025-09-12 | End: 2025-09-12

## 2025-08-28 RX ORDER — DIPHENHYDRAMINE HYDROCHLORIDE 50 MG/ML
50 INJECTION, SOLUTION INTRAMUSCULAR; INTRAVENOUS
OUTPATIENT
Start: 2025-09-12

## 2025-08-28 RX ORDER — EPINEPHRINE 1 MG/ML
0.3 INJECTION, SOLUTION, CONCENTRATE INTRAVENOUS PRN
OUTPATIENT
Start: 2025-09-12

## 2025-08-28 RX ORDER — ACETAMINOPHEN 325 MG/1
650 TABLET ORAL ONCE
Status: CANCELLED | OUTPATIENT
Start: 2025-08-29 | End: 2025-08-29

## 2025-08-28 RX ORDER — HEPARIN SODIUM (PORCINE) LOCK FLUSH IV SOLN 100 UNIT/ML 100 UNIT/ML
500 SOLUTION INTRAVENOUS PRN
OUTPATIENT
Start: 2025-09-19

## 2025-08-28 RX ORDER — FAMOTIDINE 10 MG/ML
20 INJECTION, SOLUTION INTRAVENOUS
Status: CANCELLED | OUTPATIENT
Start: 2025-08-29

## 2025-08-28 RX ORDER — SODIUM CHLORIDE 9 MG/ML
INJECTION, SOLUTION INTRAVENOUS PRN
OUTPATIENT
Start: 2025-09-05

## 2025-08-28 RX ORDER — SODIUM CHLORIDE 9 MG/ML
5-250 INJECTION, SOLUTION INTRAVENOUS PRN
OUTPATIENT
Start: 2025-09-19

## 2025-08-28 RX ORDER — ALBUTEROL SULFATE 90 UG/1
4 INHALANT RESPIRATORY (INHALATION) PRN
Status: CANCELLED | OUTPATIENT
Start: 2025-08-29

## 2025-08-28 RX ORDER — DIPHENHYDRAMINE HCL 25 MG
25 TABLET ORAL ONCE
OUTPATIENT
Start: 2025-09-19 | End: 2025-09-19

## 2025-08-28 RX ORDER — ACETAMINOPHEN 325 MG/1
650 TABLET ORAL
OUTPATIENT
Start: 2025-09-05

## 2025-08-28 RX ORDER — DIPHENHYDRAMINE HYDROCHLORIDE 50 MG/ML
50 INJECTION, SOLUTION INTRAMUSCULAR; INTRAVENOUS
OUTPATIENT
Start: 2025-09-05

## 2025-08-28 RX ORDER — HYDROCORTISONE SODIUM SUCCINATE 100 MG/2ML
100 INJECTION INTRAMUSCULAR; INTRAVENOUS
OUTPATIENT
Start: 2025-09-05

## 2025-08-28 RX ORDER — SODIUM CHLORIDE 9 MG/ML
INJECTION, SOLUTION INTRAVENOUS PRN
OUTPATIENT
Start: 2025-09-12

## 2025-08-28 RX ORDER — DEXAMETHASONE 0.5 MG/1
20 TABLET ORAL ONCE
OUTPATIENT
Start: 2025-09-05 | End: 2025-09-05

## 2025-08-28 RX ORDER — HYDROCORTISONE SODIUM SUCCINATE 100 MG/2ML
100 INJECTION INTRAMUSCULAR; INTRAVENOUS
OUTPATIENT
Start: 2025-09-12

## 2025-08-28 RX ORDER — ACETAMINOPHEN 325 MG/1
650 TABLET ORAL
Status: CANCELLED | OUTPATIENT
Start: 2025-08-29

## 2025-08-28 RX ORDER — EPINEPHRINE 1 MG/ML
0.3 INJECTION, SOLUTION, CONCENTRATE INTRAVENOUS PRN
Status: CANCELLED | OUTPATIENT
Start: 2025-08-29

## 2025-08-28 RX ORDER — ONDANSETRON 2 MG/ML
8 INJECTION INTRAMUSCULAR; INTRAVENOUS
OUTPATIENT
Start: 2025-09-05

## 2025-08-28 RX ORDER — DIPHENHYDRAMINE HCL 25 MG
25 TABLET ORAL ONCE
OUTPATIENT
Start: 2025-09-05 | End: 2025-09-05

## 2025-08-29 ENCOUNTER — OFFICE VISIT (OUTPATIENT)
Age: 50
End: 2025-08-29

## 2025-08-29 ENCOUNTER — HOSPITAL ENCOUNTER (OUTPATIENT)
Facility: HOSPITAL | Age: 50
Setting detail: INFUSION SERIES
Discharge: HOME | End: 2025-08-29
Payer: COMMERCIAL

## 2025-08-29 VITALS
HEART RATE: 85 BPM | HEIGHT: 60 IN | BODY MASS INDEX: 29.06 KG/M2 | WEIGHT: 148 LBS | RESPIRATION RATE: 16 BRPM | DIASTOLIC BLOOD PRESSURE: 83 MMHG | TEMPERATURE: 98 F | SYSTOLIC BLOOD PRESSURE: 146 MMHG | OXYGEN SATURATION: 98 %

## 2025-08-29 VITALS
HEART RATE: 85 BPM | HEIGHT: 60 IN | BODY MASS INDEX: 29.21 KG/M2 | SYSTOLIC BLOOD PRESSURE: 158 MMHG | OXYGEN SATURATION: 98 % | DIASTOLIC BLOOD PRESSURE: 83 MMHG | RESPIRATION RATE: 18 BRPM | WEIGHT: 148.8 LBS | TEMPERATURE: 98 F

## 2025-08-29 DIAGNOSIS — C90.00 MULTIPLE MYELOMA NOT HAVING ACHIEVED REMISSION (HCC): Primary | ICD-10-CM

## 2025-08-29 DIAGNOSIS — Z51.12 ENCOUNTER FOR ANTINEOPLASTIC IMMUNOTHERAPY: ICD-10-CM

## 2025-08-29 LAB
ABO + RH BLD: NORMAL
ALBUMIN SERPL-MCNC: 3.9 G/DL (ref 3.5–5.2)
ALBUMIN/GLOB SERPL: 1.2 (ref 1.1–2.2)
ALP SERPL-CCNC: 104 U/L (ref 35–104)
ALT SERPL-CCNC: 57 U/L (ref 10–35)
ANION GAP SERPL CALC-SCNC: 17 MMOL/L (ref 2–14)
AST SERPL-CCNC: 141 U/L (ref 10–35)
BASOPHILS # BLD: 0.03 K/UL (ref 0–0.1)
BASOPHILS NFR BLD: 0.9 % (ref 0–1)
BILIRUB SERPL-MCNC: 0.5 MG/DL (ref 0–1.2)
BLOOD GROUP ANTIBODIES SERPL: NORMAL
BUN SERPL-MCNC: 7 MG/DL (ref 6–20)
BUN/CREAT SERPL: 12 (ref 12–20)
CALCIUM SERPL-MCNC: 10 MG/DL (ref 8.6–10)
CHLORIDE SERPL-SCNC: 98 MMOL/L (ref 98–107)
CO2 SERPL-SCNC: 24 MMOL/L (ref 20–29)
CREAT SERPL-MCNC: 0.63 MG/DL (ref 0.6–1)
DIFFERENTIAL METHOD BLD: ABNORMAL
EOSINOPHIL # BLD: 0.02 K/UL (ref 0–0.4)
EOSINOPHIL NFR BLD: 0.6 % (ref 0–7)
ERYTHROCYTE [DISTWIDTH] IN BLOOD BY AUTOMATED COUNT: 14.7 % (ref 11.5–14.5)
GLOBULIN SER CALC-MCNC: 3.2 G/DL (ref 2–4)
GLUCOSE SERPL-MCNC: 170 MG/DL (ref 65–100)
HBV SURFACE AB SERPL IA-ACNC: 706 MIU/ML
HBV SURFACE AG SER QL: NONREACTIVE
HCT VFR BLD AUTO: 31.2 % (ref 35–47)
HGB BLD-MCNC: 11.7 G/DL (ref 11.5–16)
IMM GRANULOCYTES # BLD AUTO: 0.01 K/UL (ref 0–0.04)
IMM GRANULOCYTES NFR BLD AUTO: 0.3 % (ref 0–0.5)
LYMPHOCYTES # BLD: 1.17 K/UL (ref 0.8–3.5)
LYMPHOCYTES NFR BLD: 33.3 % (ref 12–49)
MCH RBC QN AUTO: 33.9 PG (ref 26–34)
MCHC RBC AUTO-ENTMCNC: 37.5 G/DL (ref 30–36.5)
MCV RBC AUTO: 90.4 FL (ref 80–99)
MONOCYTES # BLD: 0.39 K/UL (ref 0–1)
MONOCYTES NFR BLD: 11.1 % (ref 5–13)
NEUTS SEG # BLD: 1.89 K/UL (ref 1.8–8)
NEUTS SEG NFR BLD: 53.8 % (ref 32–75)
NRBC # BLD: 0 K/UL (ref 0–0.01)
NRBC BLD-RTO: 0 PER 100 WBC
PLATELET # BLD AUTO: 212 K/UL (ref 150–400)
PMV BLD AUTO: 9.6 FL (ref 8.9–12.9)
POTASSIUM SERPL-SCNC: 3.9 MMOL/L (ref 3.5–5.1)
PROT SERPL-MCNC: 7.1 G/DL (ref 6.4–8.3)
RBC # BLD AUTO: 3.45 M/UL (ref 3.8–5.2)
SODIUM SERPL-SCNC: 139 MMOL/L (ref 136–145)
SPECIMEN EXP DATE BLD: NORMAL
WBC # BLD AUTO: 3.5 K/UL (ref 3.6–11)

## 2025-08-29 PROCEDURE — 86706 HEP B SURFACE ANTIBODY: CPT

## 2025-08-29 PROCEDURE — 86704 HEP B CORE ANTIBODY TOTAL: CPT

## 2025-08-29 PROCEDURE — 36415 COLL VENOUS BLD VENIPUNCTURE: CPT

## 2025-08-29 PROCEDURE — 6370000000 HC RX 637 (ALT 250 FOR IP): Performed by: INTERNAL MEDICINE

## 2025-08-29 PROCEDURE — 85025 COMPLETE CBC W/AUTO DIFF WBC: CPT

## 2025-08-29 PROCEDURE — 86900 BLOOD TYPING SEROLOGIC ABO: CPT

## 2025-08-29 PROCEDURE — 2500000003 HC RX 250 WO HCPCS: Performed by: INTERNAL MEDICINE

## 2025-08-29 PROCEDURE — 86901 BLOOD TYPING SEROLOGIC RH(D): CPT

## 2025-08-29 PROCEDURE — 86850 RBC ANTIBODY SCREEN: CPT

## 2025-08-29 PROCEDURE — 96401 CHEMO ANTI-NEOPL SQ/IM: CPT

## 2025-08-29 PROCEDURE — 87340 HEPATITIS B SURFACE AG IA: CPT

## 2025-08-29 PROCEDURE — 6360000002 HC RX W HCPCS: Performed by: INTERNAL MEDICINE

## 2025-08-29 PROCEDURE — 80053 COMPREHEN METABOLIC PANEL: CPT

## 2025-08-29 RX ORDER — DIPHENHYDRAMINE HCL 25 MG
25 CAPSULE ORAL ONCE
Status: COMPLETED | OUTPATIENT
Start: 2025-08-29 | End: 2025-08-29

## 2025-08-29 RX ORDER — ACETAMINOPHEN 325 MG/1
650 TABLET ORAL ONCE
Status: COMPLETED | OUTPATIENT
Start: 2025-08-29 | End: 2025-08-29

## 2025-08-29 RX ORDER — MONTELUKAST SODIUM 10 MG/1
10 TABLET ORAL ONCE
Status: COMPLETED | OUTPATIENT
Start: 2025-08-29 | End: 2025-08-29

## 2025-08-29 RX ORDER — DEXAMETHASONE 4 MG/1
20 TABLET ORAL ONCE
Status: COMPLETED | OUTPATIENT
Start: 2025-08-29 | End: 2025-08-29

## 2025-08-29 RX ORDER — FAMOTIDINE 20 MG/1
20 TABLET, FILM COATED ORAL ONCE
Status: COMPLETED | OUTPATIENT
Start: 2025-08-29 | End: 2025-08-29

## 2025-08-29 RX ADMIN — DIPHENHYDRAMINE HYDROCHLORIDE 25 MG: 25 CAPSULE ORAL at 15:46

## 2025-08-29 RX ADMIN — DARATUMUMAB AND HYALURONIDASE-FIHJ (HUMAN RECOMBINANT) 1800 MG: 1800; 30000 INJECTION SUBCUTANEOUS at 16:55

## 2025-08-29 RX ADMIN — DEXAMETHASONE 20 MG: 4 TABLET ORAL at 15:46

## 2025-08-29 RX ADMIN — BORTEXOMIB 2.25 MG: 3.5 INJECTION, POWDER, LYOPHILIZED, FOR SOLUTION INTRAVENOUS; SUBCUTANEOUS at 16:53

## 2025-08-29 RX ADMIN — FAMOTIDINE 20 MG: 20 TABLET, FILM COATED ORAL at 15:46

## 2025-08-29 RX ADMIN — MONTELUKAST 10 MG: 10 TABLET, FILM COATED ORAL at 15:47

## 2025-08-29 RX ADMIN — ACETAMINOPHEN 650 MG: 325 TABLET ORAL at 15:47

## 2025-08-29 ASSESSMENT — PAIN DESCRIPTION - PAIN TYPE: TYPE: ACUTE PAIN

## 2025-08-29 ASSESSMENT — PAIN DESCRIPTION - LOCATION: LOCATION: RIB CAGE;ARM

## 2025-08-29 ASSESSMENT — PAIN DESCRIPTION - ORIENTATION: ORIENTATION: LEFT

## 2025-08-29 ASSESSMENT — PAIN SCALES - GENERAL: PAINLEVEL_OUTOF10: 3

## 2025-08-31 LAB — HBV CORE AB SERPL QL IA: NEGATIVE

## 2025-09-03 ENCOUNTER — TELEPHONE (OUTPATIENT)
Age: 50
End: 2025-09-03

## 2025-09-03 ENCOUNTER — TELEMEDICINE (OUTPATIENT)
Age: 50
End: 2025-09-03

## 2025-09-03 DIAGNOSIS — Z96.41 PRESENCE OF INSULIN PUMP (EXTERNAL) (INTERNAL): ICD-10-CM

## 2025-09-03 DIAGNOSIS — E10.65 TYPE 1 DIABETES MELLITUS WITH HYPERGLYCEMIA (HCC): Primary | ICD-10-CM

## 2025-09-05 ENCOUNTER — OFFICE VISIT (OUTPATIENT)
Age: 50
End: 2025-09-05

## 2025-09-05 ENCOUNTER — HOSPITAL ENCOUNTER (OUTPATIENT)
Facility: HOSPITAL | Age: 50
Setting detail: INFUSION SERIES
Discharge: HOME | End: 2025-09-05
Payer: COMMERCIAL

## 2025-09-05 VITALS
OXYGEN SATURATION: 98 % | HEIGHT: 61 IN | SYSTOLIC BLOOD PRESSURE: 126 MMHG | WEIGHT: 147 LBS | HEART RATE: 87 BPM | BODY MASS INDEX: 27.75 KG/M2 | RESPIRATION RATE: 18 BRPM | DIASTOLIC BLOOD PRESSURE: 87 MMHG | TEMPERATURE: 97.5 F

## 2025-09-05 VITALS
SYSTOLIC BLOOD PRESSURE: 126 MMHG | TEMPERATURE: 97.5 F | WEIGHT: 147.9 LBS | DIASTOLIC BLOOD PRESSURE: 87 MMHG | HEIGHT: 61 IN | HEART RATE: 87 BPM | OXYGEN SATURATION: 98 % | BODY MASS INDEX: 27.93 KG/M2 | RESPIRATION RATE: 18 BRPM

## 2025-09-05 DIAGNOSIS — Z79.899 HIGH RISK MEDICATION USE: ICD-10-CM

## 2025-09-05 DIAGNOSIS — G89.3 CANCER RELATED PAIN: ICD-10-CM

## 2025-09-05 DIAGNOSIS — C90.00 MULTIPLE MYELOMA NOT HAVING ACHIEVED REMISSION (HCC): Primary | ICD-10-CM

## 2025-09-05 DIAGNOSIS — R45.89 ANXIETY ABOUT HEALTH: ICD-10-CM

## 2025-09-05 DIAGNOSIS — Z51.12 ENCOUNTER FOR ANTINEOPLASTIC IMMUNOTHERAPY: Primary | ICD-10-CM

## 2025-09-05 DIAGNOSIS — C90.00 MULTIPLE MYELOMA NOT HAVING ACHIEVED REMISSION (HCC): ICD-10-CM

## 2025-09-05 LAB
BASOPHILS # BLD: 0.01 K/UL (ref 0–0.1)
BASOPHILS NFR BLD: 0.3 % (ref 0–1)
DIFFERENTIAL METHOD BLD: ABNORMAL
EOSINOPHIL # BLD: 0.09 K/UL (ref 0–0.4)
EOSINOPHIL NFR BLD: 2.8 % (ref 0–7)
ERYTHROCYTE [DISTWIDTH] IN BLOOD BY AUTOMATED COUNT: 14.4 % (ref 11.5–14.5)
HCT VFR BLD AUTO: 30.6 % (ref 35–47)
HGB BLD-MCNC: 11.4 G/DL (ref 11.5–16)
IMM GRANULOCYTES # BLD AUTO: 0.01 K/UL (ref 0–0.04)
IMM GRANULOCYTES NFR BLD AUTO: 0.3 % (ref 0–0.5)
LYMPHOCYTES # BLD: 0.76 K/UL (ref 0.8–3.5)
LYMPHOCYTES NFR BLD: 23.8 % (ref 12–49)
MCH RBC QN AUTO: 34.2 PG (ref 26–34)
MCHC RBC AUTO-ENTMCNC: 37.3 G/DL (ref 30–36.5)
MCV RBC AUTO: 91.9 FL (ref 80–99)
MONOCYTES # BLD: 0.37 K/UL (ref 0–1)
MONOCYTES NFR BLD: 11.6 % (ref 5–13)
NEUTS SEG # BLD: 1.96 K/UL (ref 1.8–8)
NEUTS SEG NFR BLD: 61.2 % (ref 32–75)
NRBC # BLD: 0 K/UL (ref 0–0.01)
NRBC BLD-RTO: 0 PER 100 WBC
PLATELET # BLD AUTO: 209 K/UL (ref 150–400)
PMV BLD AUTO: 10.5 FL (ref 8.9–12.9)
RBC # BLD AUTO: 3.33 M/UL (ref 3.8–5.2)
RBC MORPH BLD: ABNORMAL
WBC # BLD AUTO: 3.2 K/UL (ref 3.6–11)

## 2025-09-05 PROCEDURE — 6370000000 HC RX 637 (ALT 250 FOR IP): Performed by: INTERNAL MEDICINE

## 2025-09-05 PROCEDURE — 6360000002 HC RX W HCPCS: Performed by: INTERNAL MEDICINE

## 2025-09-05 PROCEDURE — 85025 COMPLETE CBC W/AUTO DIFF WBC: CPT

## 2025-09-05 PROCEDURE — 36415 COLL VENOUS BLD VENIPUNCTURE: CPT

## 2025-09-05 PROCEDURE — 96401 CHEMO ANTI-NEOPL SQ/IM: CPT

## 2025-09-05 PROCEDURE — 2500000003 HC RX 250 WO HCPCS: Performed by: INTERNAL MEDICINE

## 2025-09-05 RX ORDER — DIPHENHYDRAMINE HCL 25 MG
25 CAPSULE ORAL ONCE
Status: COMPLETED | OUTPATIENT
Start: 2025-09-05 | End: 2025-09-05

## 2025-09-05 RX ORDER — TRAMADOL HYDROCHLORIDE 50 MG/1
50 TABLET ORAL EVERY 8 HOURS PRN
Qty: 80 TABLET | Refills: 0 | Status: SHIPPED | OUTPATIENT
Start: 2025-09-05 | End: 2025-10-05

## 2025-09-05 RX ORDER — DEXAMETHASONE 4 MG/1
20 TABLET ORAL ONCE
Status: COMPLETED | OUTPATIENT
Start: 2025-09-05 | End: 2025-09-05

## 2025-09-05 RX ORDER — ACETAMINOPHEN 325 MG/1
650 TABLET ORAL ONCE
Status: COMPLETED | OUTPATIENT
Start: 2025-09-05 | End: 2025-09-05

## 2025-09-05 RX ADMIN — DIPHENHYDRAMINE HYDROCHLORIDE 25 MG: 25 CAPSULE ORAL at 15:03

## 2025-09-05 RX ADMIN — BORTEXOMIB 2.25 MG: 3.5 INJECTION, POWDER, LYOPHILIZED, FOR SOLUTION INTRAVENOUS; SUBCUTANEOUS at 16:15

## 2025-09-05 RX ADMIN — DEXAMETHASONE 20 MG: 4 TABLET ORAL at 15:04

## 2025-09-05 RX ADMIN — ACETAMINOPHEN 650 MG: 325 TABLET ORAL at 15:03

## 2025-09-05 ASSESSMENT — PAIN DESCRIPTION - ORIENTATION: ORIENTATION: LEFT

## 2025-09-05 ASSESSMENT — PAIN DESCRIPTION - LOCATION: LOCATION: BACK;HIP
